# Patient Record
Sex: MALE | Race: WHITE | NOT HISPANIC OR LATINO | Employment: FULL TIME | URBAN - METROPOLITAN AREA
[De-identification: names, ages, dates, MRNs, and addresses within clinical notes are randomized per-mention and may not be internally consistent; named-entity substitution may affect disease eponyms.]

---

## 2021-04-14 ENCOUNTER — OFFICE VISIT (OUTPATIENT)
Dept: NURSING | Facility: CLINIC | Age: 26
End: 2021-04-14
Payer: COMMERCIAL

## 2021-04-14 PROCEDURE — 0001A PR COVID VAC PFIZER DIL RECON 30 MCG/0.3 ML IM: CPT

## 2021-04-14 PROCEDURE — 91300 PR COVID VAC PFIZER DIL RECON 30 MCG/0.3 ML IM: CPT

## 2021-05-05 ENCOUNTER — IMMUNIZATION (OUTPATIENT)
Dept: NURSING | Facility: CLINIC | Age: 26
End: 2021-05-05
Attending: INTERNAL MEDICINE
Payer: COMMERCIAL

## 2021-05-05 PROCEDURE — 91300 PR COVID VAC PFIZER DIL RECON 30 MCG/0.3 ML IM: CPT

## 2021-05-05 PROCEDURE — 0002A PR COVID VAC PFIZER DIL RECON 30 MCG/0.3 ML IM: CPT

## 2021-05-16 ENCOUNTER — HEALTH MAINTENANCE LETTER (OUTPATIENT)
Age: 26
End: 2021-05-16

## 2021-09-05 ENCOUNTER — HEALTH MAINTENANCE LETTER (OUTPATIENT)
Age: 26
End: 2021-09-05

## 2022-03-04 ENCOUNTER — HOSPITAL ENCOUNTER (EMERGENCY)
Facility: CLINIC | Age: 27
Discharge: OTHER INSTITUTION | End: 2022-03-04
Attending: EMERGENCY MEDICINE | Admitting: EMERGENCY MEDICINE
Payer: COMMERCIAL

## 2022-03-04 ENCOUNTER — APPOINTMENT (OUTPATIENT)
Dept: CT IMAGING | Facility: CLINIC | Age: 27
End: 2022-03-04
Attending: EMERGENCY MEDICINE
Payer: COMMERCIAL

## 2022-03-04 ENCOUNTER — APPOINTMENT (OUTPATIENT)
Dept: ULTRASOUND IMAGING | Facility: CLINIC | Age: 27
End: 2022-03-04
Attending: EMERGENCY MEDICINE
Payer: COMMERCIAL

## 2022-03-04 VITALS
RESPIRATION RATE: 36 BRPM | TEMPERATURE: 99.6 F | HEIGHT: 67 IN | SYSTOLIC BLOOD PRESSURE: 126 MMHG | OXYGEN SATURATION: 95 % | DIASTOLIC BLOOD PRESSURE: 101 MMHG | HEART RATE: 122 BPM | WEIGHT: 157 LBS | BODY MASS INDEX: 24.64 KG/M2

## 2022-03-04 DIAGNOSIS — R79.89 ELEVATED BRAIN NATRIURETIC PEPTIDE (BNP) LEVEL: ICD-10-CM

## 2022-03-04 DIAGNOSIS — I82.4Z2 LOWER LEG DVT (DEEP VENOUS THROMBOEMBOLISM), ACUTE, LEFT (H): ICD-10-CM

## 2022-03-04 DIAGNOSIS — I26.99 BILATERAL PULMONARY EMBOLISM (H): ICD-10-CM

## 2022-03-04 LAB
ALBUMIN UR-MCNC: 100 MG/DL
ANION GAP SERPL CALCULATED.3IONS-SCNC: 9 MMOL/L (ref 3–14)
APPEARANCE UR: CLEAR
BASOPHILS # BLD AUTO: 0 10E3/UL (ref 0–0.2)
BASOPHILS NFR BLD AUTO: 0 %
BILIRUB UR QL STRIP: NEGATIVE
BUN SERPL-MCNC: 15 MG/DL (ref 7–30)
CALCIUM SERPL-MCNC: 9.3 MG/DL (ref 8.5–10.1)
CHLORIDE BLD-SCNC: 101 MMOL/L (ref 94–109)
CO2 SERPL-SCNC: 26 MMOL/L (ref 20–32)
COLOR UR AUTO: YELLOW
CREAT SERPL-MCNC: 0.9 MG/DL (ref 0.66–1.25)
D DIMER PPP FEU-MCNC: 10.2 UG/ML FEU (ref 0–0.5)
EOSINOPHIL # BLD AUTO: 0 10E3/UL (ref 0–0.7)
EOSINOPHIL NFR BLD AUTO: 0 %
ERYTHROCYTE [DISTWIDTH] IN BLOOD BY AUTOMATED COUNT: 12.1 % (ref 10–15)
GFR SERPL CREATININE-BSD FRML MDRD: >90 ML/MIN/1.73M2
GLUCOSE BLD-MCNC: 107 MG/DL (ref 70–99)
GLUCOSE UR STRIP-MCNC: NEGATIVE MG/DL
HCT VFR BLD AUTO: 51.6 % (ref 40–53)
HGB BLD-MCNC: 17.3 G/DL (ref 13.3–17.7)
HGB UR QL STRIP: ABNORMAL
HOLD SPECIMEN: NORMAL
IMM GRANULOCYTES # BLD: 0 10E3/UL
IMM GRANULOCYTES NFR BLD: 0 %
KETONES UR STRIP-MCNC: ABNORMAL MG/DL
LEUKOCYTE ESTERASE UR QL STRIP: NEGATIVE
LYMPHOCYTES # BLD AUTO: 0.4 10E3/UL (ref 0.8–5.3)
LYMPHOCYTES NFR BLD AUTO: 4 %
MCH RBC QN AUTO: 32.8 PG (ref 26.5–33)
MCHC RBC AUTO-ENTMCNC: 33.5 G/DL (ref 31.5–36.5)
MCV RBC AUTO: 98 FL (ref 78–100)
MONOCYTES # BLD AUTO: 0.5 10E3/UL (ref 0–1.3)
MONOCYTES NFR BLD AUTO: 5 %
MUCOUS THREADS #/AREA URNS LPF: PRESENT /LPF
NEUTROPHILS # BLD AUTO: 10.1 10E3/UL (ref 1.6–8.3)
NEUTROPHILS NFR BLD AUTO: 91 %
NITRATE UR QL: NEGATIVE
NRBC # BLD AUTO: 0 10E3/UL
NRBC BLD AUTO-RTO: 0 /100
NT-PROBNP SERPL-MCNC: 542 PG/ML (ref 0–450)
PH UR STRIP: 6 [PH] (ref 5–7)
PLATELET # BLD AUTO: 214 10E3/UL (ref 150–450)
POTASSIUM BLD-SCNC: 4.2 MMOL/L (ref 3.4–5.3)
RBC # BLD AUTO: 5.27 10E6/UL (ref 4.4–5.9)
RBC URINE: 2 /HPF
SODIUM SERPL-SCNC: 136 MMOL/L (ref 133–144)
SP GR UR STRIP: 1.03 (ref 1–1.03)
TROPONIN I SERPL HS-MCNC: 12 NG/L
UROBILINOGEN UR STRIP-MCNC: NORMAL MG/DL
WBC # BLD AUTO: 11.1 10E3/UL (ref 4–11)
WBC URINE: 0 /HPF

## 2022-03-04 PROCEDURE — 85025 COMPLETE CBC W/AUTO DIFF WBC: CPT | Performed by: EMERGENCY MEDICINE

## 2022-03-04 PROCEDURE — 250N000011 HC RX IP 250 OP 636: Performed by: EMERGENCY MEDICINE

## 2022-03-04 PROCEDURE — 96361 HYDRATE IV INFUSION ADD-ON: CPT

## 2022-03-04 PROCEDURE — 96376 TX/PRO/DX INJ SAME DRUG ADON: CPT

## 2022-03-04 PROCEDURE — 82310 ASSAY OF CALCIUM: CPT | Performed by: EMERGENCY MEDICINE

## 2022-03-04 PROCEDURE — 250N000013 HC RX MED GY IP 250 OP 250 PS 637: Performed by: EMERGENCY MEDICINE

## 2022-03-04 PROCEDURE — 36415 COLL VENOUS BLD VENIPUNCTURE: CPT | Performed by: EMERGENCY MEDICINE

## 2022-03-04 PROCEDURE — 74177 CT ABD & PELVIS W/CONTRAST: CPT

## 2022-03-04 PROCEDURE — 258N000003 HC RX IP 258 OP 636: Performed by: EMERGENCY MEDICINE

## 2022-03-04 PROCEDURE — 96375 TX/PRO/DX INJ NEW DRUG ADDON: CPT | Mod: 59

## 2022-03-04 PROCEDURE — 93971 EXTREMITY STUDY: CPT | Mod: LT

## 2022-03-04 PROCEDURE — 84484 ASSAY OF TROPONIN QUANT: CPT | Performed by: EMERGENCY MEDICINE

## 2022-03-04 PROCEDURE — 81001 URINALYSIS AUTO W/SCOPE: CPT | Performed by: EMERGENCY MEDICINE

## 2022-03-04 PROCEDURE — 93005 ELECTROCARDIOGRAM TRACING: CPT

## 2022-03-04 PROCEDURE — 83880 ASSAY OF NATRIURETIC PEPTIDE: CPT | Performed by: EMERGENCY MEDICINE

## 2022-03-04 PROCEDURE — 99285 EMERGENCY DEPT VISIT HI MDM: CPT | Mod: 25

## 2022-03-04 PROCEDURE — 96365 THER/PROPH/DIAG IV INF INIT: CPT | Mod: 59

## 2022-03-04 PROCEDURE — 85379 FIBRIN DEGRADATION QUANT: CPT | Performed by: EMERGENCY MEDICINE

## 2022-03-04 PROCEDURE — 96366 THER/PROPH/DIAG IV INF ADDON: CPT

## 2022-03-04 RX ORDER — IOPAMIDOL 755 MG/ML
500 INJECTION, SOLUTION INTRAVASCULAR ONCE
Status: COMPLETED | OUTPATIENT
Start: 2022-03-04 | End: 2022-03-04

## 2022-03-04 RX ORDER — ACETAMINOPHEN 500 MG
1000 TABLET ORAL ONCE
Status: COMPLETED | OUTPATIENT
Start: 2022-03-04 | End: 2022-03-04

## 2022-03-04 RX ORDER — HEPARIN SODIUM 10000 [USP'U]/100ML
0-5000 INJECTION, SOLUTION INTRAVENOUS CONTINUOUS
Status: DISCONTINUED | OUTPATIENT
Start: 2022-03-04 | End: 2022-03-05 | Stop reason: HOSPADM

## 2022-03-04 RX ORDER — ONDANSETRON 2 MG/ML
4 INJECTION INTRAMUSCULAR; INTRAVENOUS ONCE
Status: COMPLETED | OUTPATIENT
Start: 2022-03-04 | End: 2022-03-04

## 2022-03-04 RX ORDER — KETOROLAC TROMETHAMINE 15 MG/ML
15 INJECTION, SOLUTION INTRAMUSCULAR; INTRAVENOUS ONCE
Status: COMPLETED | OUTPATIENT
Start: 2022-03-04 | End: 2022-03-04

## 2022-03-04 RX ORDER — HYDROMORPHONE HYDROCHLORIDE 1 MG/ML
0.5 INJECTION, SOLUTION INTRAMUSCULAR; INTRAVENOUS; SUBCUTANEOUS
Status: DISCONTINUED | OUTPATIENT
Start: 2022-03-04 | End: 2022-03-05 | Stop reason: HOSPADM

## 2022-03-04 RX ADMIN — ACETAMINOPHEN 1000 MG: 500 TABLET, FILM COATED ORAL at 22:26

## 2022-03-04 RX ADMIN — HYDROMORPHONE HYDROCHLORIDE 0.5 MG: 1 INJECTION, SOLUTION INTRAMUSCULAR; INTRAVENOUS; SUBCUTANEOUS at 23:38

## 2022-03-04 RX ADMIN — KETOROLAC TROMETHAMINE 15 MG: 15 INJECTION, SOLUTION INTRAMUSCULAR; INTRAVENOUS at 18:13

## 2022-03-04 RX ADMIN — SODIUM CHLORIDE 1000 ML: 9 INJECTION, SOLUTION INTRAVENOUS at 17:56

## 2022-03-04 RX ADMIN — ONDANSETRON 4 MG: 2 INJECTION INTRAMUSCULAR; INTRAVENOUS at 17:56

## 2022-03-04 RX ADMIN — IOPAMIDOL 75 ML: 755 INJECTION, SOLUTION INTRAVENOUS at 19:16

## 2022-03-04 RX ADMIN — HEPARIN SODIUM AND DEXTROSE 1300 UNITS/HR: 10000; 5 INJECTION INTRAVENOUS at 19:55

## 2022-03-04 ASSESSMENT — ENCOUNTER SYMPTOMS
VOMITING: 1
COUGH: 0
NAUSEA: 1
FEVER: 0
SHORTNESS OF BREATH: 1
MYALGIAS: 1

## 2022-03-04 NOTE — ED TRIAGE NOTES
Pt presents for complaint of left testicle, left calf, abdominal and chest pain. Pt states the calf and testicle pain started yesterday and the other symptoms onset today. States nausea started this morning and patient is dry heaving in triage. Seen at his clinic and sent in for evaluation. History of thrombotic diseases in his family. ABC intact, A&Ox4.  
no deformity, pain or tenderness. no restriction of movement

## 2022-03-04 NOTE — ED PROVIDER NOTES
History   Chief Complaint:  Chest Pain     HPI     Desmond Huerta is a 26 year old male who presents with left-sided pain.  Patient had asymptomatic COVID approximately 1 month prior.  He did well and has remained asymptomatic up until 2 days ago.  He began to have nausea and vomiting for last 2 days now with dry heaving.  Today he woke up with left calf pain that then radiated to the thigh, the testicle, the left side the abdomen and lower chest.  The pain is constant and aggravated by movement.  There are no alleviating factors.  He also mentioned feeling short of breath which is markedly worse with exertion and lying flat.  He reports that the calf pain is most significant.  He denies any urethral discharge, dysuria, hematuria.  He has a strong family history of antithrombin III disorder but he has no history of DVT, PE, recent immobilization, hemoptysis, malignancy.    Review of Systems   Constitutional: Negative for fever.   Respiratory: Positive for shortness of breath. Negative for cough.    Cardiovascular: Positive for chest pain.   Gastrointestinal: Positive for nausea and vomiting.   Musculoskeletal: Positive for myalgias.   Skin: Negative for rash.   All other systems reviewed and are negative.    Allergies:  No Known Allergies    Medications:  None    Past Medical History:     None     Family History:   Father: Antithrombin III deficiency, hyperlipidemia, hypertension   Mother: hypertension, hyperlipidemia      Social History:  Escorted to ER by a parent.   PCP: No Ref-Primary, Physician    Physical Exam     Patient Vitals for the past 24 hrs:   BP Temp Temp src Pulse Resp SpO2 Height Weight   03/04/22 2000 130/81 -- -- (!) 123 19 98 % -- --   03/04/22 1855 -- -- -- (!) 121 28 92 % -- --   03/04/22 1852 -- -- -- (!) 121 (!) 34 (!) 89 % -- --   03/04/22 1830 (!) 116/93 -- -- (!) 122 30 92 % -- --   03/04/22 1803 134/83 -- -- (!) 122 25 95 % -- --   03/04/22 1745 129/86 -- -- 112 (!) 34 96 % -- --  "  03/04/22 1729 134/80 99.6  F (37.6  C) Oral (!) 134 16 95 % 1.702 m (5' 7\") 71.2 kg (157 lb)     Physical Exam    Eyes:    Conjunctiva normal  Neck:    Supple, no meningismus.     CV:     Regular rate and rhythm.      No murmurs, rubs or gallops.         No lower extremity edema.     2+ DP pulses bilateral  PULM:    Clear to auscultation bilateral.       No respiratory distress.      Good air exchange.     No rales or wheezing.     No stridor.  ABD:    Soft, non-distended.       No abdominal tenderness.     Bowel sounds normal.     No pulsatile masses.       No rebound, guarding or rigidity.  :   Paulino stage V male, circumcised genitalia.     No inguinal lymphadenopathy.     No inguinal hernia.     No urethral discharge.     Testes in a normal position/lie.     + cremasteric reflex.     No focal testicular or epididymal tenderness.  MSK:     LLE:      No overt edema      No warmth erythema      Mild diffuse tenderness through the calf      Positive Hoffman test  LYMPH:   No cervical lymphadenopathy.  NEURO:   Alert.  Good muscular tone, no atrophy.      Strength is equal and symmetric.  Skin:    Warm, dry and intact.    Psych:    Mood is good and affect is appropriate.    Emergency Department Course   ECG:  ECG taken at 1751, ECG read at 1754  Sinus tachycardia   Rightward axis  Borderline ECG  Rate 111 bpm. TX interval 152 ms. QRS duration 84 ms. QT/QTc 334/454 ms. P-R-T axes 71 95 72.    Imaging:  US Lower Extremity Venous Duplex Left   Final Result   IMPRESSION:   1.  Occlusive and nonocclusive DVT within posterior tibial and peroneal veins within the calf and within the popliteal vein.      Results called to Percy Sandoval at 8:10 PM.      CT Chest (PE) Abdomen Pelvis w Contrast   Final Result   IMPRESSION:   1.  At least moderate burden of acute bilateral pulmonary emboli with evidence for right heart strain.   2.  Airspace disease left lower lobe and left upper lobe most consistent with pneumonia. " Follow-up recommended in 3 months to ensure resolution and exclude other underlying pathology.   3.  Hepatic steatosis.      Results called to Percy Sandoval at 7:50 PM.        Report per radiology    Laboratory:  Labs Ordered and Resulted from Time of ED Arrival to Time of ED Departure   BASIC METABOLIC PANEL - Abnormal       Result Value    Sodium 136      Potassium 4.2      Chloride 101      Carbon Dioxide (CO2) 26      Anion Gap 9      Urea Nitrogen 15      Creatinine 0.90      Calcium 9.3      Glucose 107 (*)     GFR Estimate >90     D DIMER QUANTITATIVE - Abnormal    D-Dimer Quantitative 10.20 (*)    CBC WITH PLATELETS AND DIFFERENTIAL - Abnormal    WBC Count 11.1 (*)     RBC Count 5.27      Hemoglobin 17.3      Hematocrit 51.6      MCV 98      MCH 32.8      MCHC 33.5      RDW 12.1      Platelet Count 214      % Neutrophils 91      % Lymphocytes 4      % Monocytes 5      % Eosinophils 0      % Basophils 0      % Immature Granulocytes 0      NRBCs per 100 WBC 0      Absolute Neutrophils 10.1 (*)     Absolute Lymphocytes 0.4 (*)     Absolute Monocytes 0.5      Absolute Eosinophils 0.0      Absolute Basophils 0.0      Absolute Immature Granulocytes 0.0      Absolute NRBCs 0.0     ROUTINE UA WITH MICROSCOPIC REFLEX TO CULTURE - Abnormal    Color Urine Yellow      Appearance Urine Clear      Glucose Urine Negative      Bilirubin Urine Negative      Ketones Urine Trace (*)     Specific Gravity Urine 1.033      Blood Urine Trace (*)     pH Urine 6.0      Protein Albumin Urine 100  (*)     Urobilinogen Urine Normal      Nitrite Urine Negative      Leukocyte Esterase Urine Negative      Mucus Urine Present (*)     RBC Urine 2      WBC Urine 0     NT PROBNP INPATIENT - Abnormal    N terminal Pro BNP Inpatient 542 (*)    TROPONIN I - Normal    Troponin I High Sensitivity 12        Procedures    Emergency Department Course:    Reviewed:  I reviewed nursing notes, vitals, past medical history and Care  Everywhere    Assessments:   I obtained history and examined the patient as noted above.    I rechecked the patient and explained findings.     Consults:   I spoke with Dr. Thompson with Interventional Radiology on the phone.     I spoke with Dr. Ismael Ford with [facility] [place] the [place].     Interventions:    0.9% sodium chloride BOLUS 1000 mL, IV    ondansetron (ZOFRAN) injection 4 mg, IV    ketorolac (TORADOL) injection 15 mg, IV    heparin ANTICOAGULANT Loading dose for HIGH INTENSITY TREATMENT 5700 unites, IV    heparin infusion 25,000 units in D5W 250 mL ANTICOAGULANT 1300 units/hr, IV    Disposition:  The patient was transferred to via EMS. Dr. Ismael Ford accepted the patient for transfer.     Impression & Plan     Medical Decision Makin-year-old male presented to the ED ED with left leg pain that migrated to the left upper abdomen and left chest.  He was tachycardic in the absence of hypotension.  He developed hypoxia requiring 2 L per nasal cannula and has a strong family history of Antithrombin III deficiency.  Primary concern was for DVT and resulting thromboembolism with PE.      EKG with tachycardia but without right heart strain.  Basic laboratory studies revealed elevated BNP but troponin within normal limits.  He underwent CT scan of his chest/abdomen/pelvis revealing bilateral PE with moderate clot burden on the right greater than left with right heart strain is identified by RV dilatation.      Patient was initiated on heparin.  No indication for TPA as patient has remained hemodynamically stable.  I spoke with interventional radiologist and we discussed the need for thrombectomy.  They believe patient is a good candidate for thrombectomy given his submassive PE findings and young age.  They recommended transfer to a facility with IR capabilities.  Unfortunately there were no beds available at [facility].  Patient ultimately accepted by ED physician at [facility] for ongoing management.    Critical Care Time: was 30 minutes for this patient excluding procedures    Diagnosis:    ICD-10-CM    1. Bilateral pulmonary embolism (H)  I26.99    2. Elevated brain natriuretic peptide (BNP) level  R79.89    3. Lower leg DVT (deep venous thromboembolism), acute, left (H)  I82.4Z2        Discharge Medications:  New Prescriptions    No medications on file     Scribe Disclosure:  I, Juaquin Montes, am serving as a scribe at 5:36 PM on 3/4/2022 to document services personally performed by Percy Sandoval MD based on my observations and the provider's statements to me.     I, Michelle Lopez, am serving as a scribe at 6:37 PM on 3/4/2022 to document services personally performed by Percy Sandoval MD based on my observations and the provider's statements to me.         Percy Sandoval MD  03/04/22 6216

## 2022-03-05 LAB
ATRIAL RATE - MUSE: 111 BPM
DIASTOLIC BLOOD PRESSURE - MUSE: NORMAL MMHG
INTERPRETATION ECG - MUSE: NORMAL
P AXIS - MUSE: 71 DEGREES
PR INTERVAL - MUSE: 152 MS
QRS DURATION - MUSE: 84 MS
QT - MUSE: 334 MS
QTC - MUSE: 454 MS
R AXIS - MUSE: 95 DEGREES
SYSTOLIC BLOOD PRESSURE - MUSE: NORMAL MMHG
T AXIS - MUSE: 72 DEGREES
VENTRICULAR RATE- MUSE: 111 BPM

## 2022-04-22 ENCOUNTER — APPOINTMENT (OUTPATIENT)
Dept: GENERAL RADIOLOGY | Facility: CLINIC | Age: 27
End: 2022-04-22
Attending: NURSE PRACTITIONER
Payer: COMMERCIAL

## 2022-04-22 ENCOUNTER — HOSPITAL ENCOUNTER (EMERGENCY)
Facility: CLINIC | Age: 27
Discharge: HOME | End: 2022-04-22
Attending: NURSE PRACTITIONER | Admitting: NURSE PRACTITIONER
Payer: COMMERCIAL

## 2022-04-22 ENCOUNTER — APPOINTMENT (OUTPATIENT)
Dept: CT IMAGING | Facility: CLINIC | Age: 27
End: 2022-04-22
Attending: NURSE PRACTITIONER
Payer: COMMERCIAL

## 2022-04-22 VITALS
OXYGEN SATURATION: 97 % | DIASTOLIC BLOOD PRESSURE: 91 MMHG | TEMPERATURE: 97.7 F | BODY MASS INDEX: 26.68 KG/M2 | WEIGHT: 170 LBS | HEART RATE: 79 BPM | HEIGHT: 67 IN | RESPIRATION RATE: 19 BRPM | SYSTOLIC BLOOD PRESSURE: 123 MMHG

## 2022-04-22 DIAGNOSIS — I27.82 CHRONIC PULMONARY EMBOLISM (H): ICD-10-CM

## 2022-04-22 DIAGNOSIS — R07.9 CHEST PAIN, UNSPECIFIED TYPE: ICD-10-CM

## 2022-04-22 DIAGNOSIS — R07.9 CHEST PAIN: ICD-10-CM

## 2022-04-22 PROBLEM — I26.99 ACUTE PULMONARY EMBOLISM (H): Status: ACTIVE | Noted: 2022-03-05

## 2022-04-22 LAB
ALBUMIN SERPL-MCNC: 3.9 G/DL (ref 3.4–5)
ALP SERPL-CCNC: 96 U/L (ref 40–150)
ALT SERPL W P-5'-P-CCNC: 56 U/L (ref 0–70)
ANION GAP SERPL CALCULATED.3IONS-SCNC: 5 MMOL/L (ref 3–14)
AST SERPL W P-5'-P-CCNC: 35 U/L (ref 0–45)
ATRIAL RATE - MUSE: 80 BPM
BASOPHILS # BLD AUTO: 0.1 10E3/UL (ref 0–0.2)
BASOPHILS NFR BLD AUTO: 1 %
BILIRUB SERPL-MCNC: 0.3 MG/DL (ref 0.2–1.3)
BUN SERPL-MCNC: 14 MG/DL (ref 7–30)
CALCIUM SERPL-MCNC: 9.5 MG/DL (ref 8.5–10.1)
CHLORIDE BLD-SCNC: 106 MMOL/L (ref 94–109)
CO2 SERPL-SCNC: 27 MMOL/L (ref 20–32)
CREAT SERPL-MCNC: 0.91 MG/DL (ref 0.66–1.25)
D DIMER PPP FEU-MCNC: 0.35 UG/ML FEU (ref 0–0.5)
DIASTOLIC BLOOD PRESSURE - MUSE: NORMAL MMHG
EOSINOPHIL # BLD AUTO: 0.1 10E3/UL (ref 0–0.7)
EOSINOPHIL NFR BLD AUTO: 1 %
ERYTHROCYTE [DISTWIDTH] IN BLOOD BY AUTOMATED COUNT: 12.6 % (ref 10–15)
GFR SERPL CREATININE-BSD FRML MDRD: >90 ML/MIN/1.73M2
GLUCOSE BLD-MCNC: 118 MG/DL (ref 70–99)
HCT VFR BLD AUTO: 43.4 % (ref 40–53)
HGB BLD-MCNC: 14.4 G/DL (ref 13.3–17.7)
IMM GRANULOCYTES # BLD: 0 10E3/UL
IMM GRANULOCYTES NFR BLD: 0 %
INTERPRETATION ECG - MUSE: NORMAL
LIPASE SERPL-CCNC: 92 U/L (ref 73–393)
LYMPHOCYTES # BLD AUTO: 1.6 10E3/UL (ref 0.8–5.3)
LYMPHOCYTES NFR BLD AUTO: 26 %
MCH RBC QN AUTO: 33.3 PG (ref 26.5–33)
MCHC RBC AUTO-ENTMCNC: 33.2 G/DL (ref 31.5–36.5)
MCV RBC AUTO: 100 FL (ref 78–100)
MONOCYTES # BLD AUTO: 0.5 10E3/UL (ref 0–1.3)
MONOCYTES NFR BLD AUTO: 8 %
NEUTROPHILS # BLD AUTO: 4 10E3/UL (ref 1.6–8.3)
NEUTROPHILS NFR BLD AUTO: 64 %
NRBC # BLD AUTO: 0 10E3/UL
NRBC BLD AUTO-RTO: 0 /100
NT-PROBNP SERPL-MCNC: 9 PG/ML (ref 0–450)
P AXIS - MUSE: 65 DEGREES
PLATELET # BLD AUTO: 328 10E3/UL (ref 150–450)
POTASSIUM BLD-SCNC: 3.8 MMOL/L (ref 3.4–5.3)
PR INTERVAL - MUSE: 162 MS
PROT SERPL-MCNC: 7.2 G/DL (ref 6.8–8.8)
QRS DURATION - MUSE: 92 MS
QT - MUSE: 370 MS
QTC - MUSE: 426 MS
R AXIS - MUSE: 79 DEGREES
RBC # BLD AUTO: 4.33 10E6/UL (ref 4.4–5.9)
SODIUM SERPL-SCNC: 138 MMOL/L (ref 133–144)
SYSTOLIC BLOOD PRESSURE - MUSE: NORMAL MMHG
T AXIS - MUSE: 39 DEGREES
TROPONIN I SERPL HS-MCNC: <3 NG/L
TROPONIN I SERPL HS-MCNC: <3 NG/L
VENTRICULAR RATE- MUSE: 80 BPM
WBC # BLD AUTO: 6.2 10E3/UL (ref 4–11)

## 2022-04-22 PROCEDURE — 71275 CT ANGIOGRAPHY CHEST: CPT

## 2022-04-22 PROCEDURE — 36415 COLL VENOUS BLD VENIPUNCTURE: CPT | Performed by: EMERGENCY MEDICINE

## 2022-04-22 PROCEDURE — 80053 COMPREHEN METABOLIC PANEL: CPT | Performed by: NURSE PRACTITIONER

## 2022-04-22 PROCEDURE — 250N000011 HC RX IP 250 OP 636: Performed by: NURSE PRACTITIONER

## 2022-04-22 PROCEDURE — 85025 COMPLETE CBC W/AUTO DIFF WBC: CPT | Performed by: EMERGENCY MEDICINE

## 2022-04-22 PROCEDURE — 99285 EMERGENCY DEPT VISIT HI MDM: CPT | Mod: 25

## 2022-04-22 PROCEDURE — 85379 FIBRIN DEGRADATION QUANT: CPT | Performed by: EMERGENCY MEDICINE

## 2022-04-22 PROCEDURE — 250N000009 HC RX 250: Performed by: NURSE PRACTITIONER

## 2022-04-22 PROCEDURE — 93005 ELECTROCARDIOGRAM TRACING: CPT

## 2022-04-22 PROCEDURE — 36415 COLL VENOUS BLD VENIPUNCTURE: CPT | Performed by: NURSE PRACTITIONER

## 2022-04-22 PROCEDURE — 250N000013 HC RX MED GY IP 250 OP 250 PS 637: Performed by: NURSE PRACTITIONER

## 2022-04-22 PROCEDURE — 83880 ASSAY OF NATRIURETIC PEPTIDE: CPT | Performed by: NURSE PRACTITIONER

## 2022-04-22 PROCEDURE — 83690 ASSAY OF LIPASE: CPT | Performed by: NURSE PRACTITIONER

## 2022-04-22 PROCEDURE — 71046 X-RAY EXAM CHEST 2 VIEWS: CPT

## 2022-04-22 PROCEDURE — 84484 ASSAY OF TROPONIN QUANT: CPT | Performed by: NURSE PRACTITIONER

## 2022-04-22 RX ORDER — IOPAMIDOL 755 MG/ML
65 INJECTION, SOLUTION INTRAVASCULAR ONCE
Status: COMPLETED | OUTPATIENT
Start: 2022-04-22 | End: 2022-04-22

## 2022-04-22 RX ORDER — RIVAROXABAN 20 MG/1
TABLET, FILM COATED ORAL
COMMUNITY
Start: 2022-03-07

## 2022-04-22 RX ORDER — ACETAMINOPHEN 500 MG
1000 TABLET ORAL ONCE
Status: COMPLETED | OUTPATIENT
Start: 2022-04-22 | End: 2022-04-22

## 2022-04-22 RX ORDER — HYDROMORPHONE HYDROCHLORIDE 2 MG/1
TABLET ORAL
COMMUNITY
Start: 2022-03-07 | End: 2022-04-22

## 2022-04-22 RX ADMIN — SODIUM CHLORIDE 90 ML: 9 INJECTION, SOLUTION INTRAVENOUS at 14:01

## 2022-04-22 RX ADMIN — ACETAMINOPHEN 1000 MG: 500 TABLET, FILM COATED ORAL at 13:05

## 2022-04-22 RX ADMIN — IOPAMIDOL 65 ML: 755 INJECTION, SOLUTION INTRAVENOUS at 14:01

## 2022-04-22 ASSESSMENT — ENCOUNTER SYMPTOMS: SHORTNESS OF BREATH: 1

## 2022-04-22 NOTE — ED PROVIDER NOTES
"  History   Chief Complaint:  Chest Pain       The history is provided by the patient.      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 3/5/22 Desmond had been diagnosed with a PE and LLE DVT. He has been taking his Xarelto since then without missing any doses. On Wednesday Desmond worked out at the gym, reportedly feeling fine afterwards. Yesterday morning Desmond experienced an onset of right sided chest pain. This pain worsened this morning, prompting him to visit the ED. Pain is described as dull, achy, and sharp, with the sensation changing between these intermittently. Pain also seems to improve when he stretches his arms above his head. Here at the ED he endorses concern for another PE, although his does not feel this pain is similar to his last PE. Confirms shortness of breath at bedside. Denies any recent travel or tobacco use. No leg swelling.     Review of Systems   Respiratory: Positive for shortness of breath.    Cardiovascular: Positive for chest pain. Negative for leg swelling.   All other systems reviewed and are negative.    Allergies:  No Known Allergies    Medications:  Xarelto    Past Medical History:     PE without acute cor pulmonale  Antithrombin 3 deficiency     Past Surgical History:    The patient does not have any pertinent past surgical history.     Family History:    Factor V Leiden  PE  DVT  Stroke    Social History:  Patient unaccompanied  PCP: No Ref-Primary, Physician   Tobacco: Negative    Physical Exam     Patient Vitals for the past 24 hrs:   BP Temp Temp src Pulse Resp SpO2 Height Weight   04/22/22 1440 111/70 -- -- 67 -- -- -- --   04/22/22 1252 -- -- -- 73 19 -- -- --   04/22/22 1038 139/71 97.7  F (36.5  C) Oral 81 20 97 % 1.702 m (5' 7\") 77.1 kg (170 lb)       Physical Exam  Nursing notes reviewed. Vitals reviewed.  General: Alert. Well kept.  Eyes:  Conjunctiva non-injected, " non-icteric.  Neck/Throat: Moist mucous membranes.  Normal voice.  Cardiac: Regular rhythm. Normal heart sounds with no murmur/rubs/click.   Pulmonary: Clear and equal breath sounds bilaterally. No crackles/rales. No wheezing  Abdomen: Soft. Non-distended. Non-tender to palpation. No masses. No guarding or rebound.  Musculoskeletal: Normal gross range of motion of all 4 extremities.    Neurological: Alert and oriented x4.   Skin: Warm and dry without rashes or petechiae. Normal appearance of visualized exposed skin. Wearing LLE compression stocking.  Psych: Affect normal. Good eye contact.    Emergency Department Course   ECG  ECG obtained at 1043, ECG read at 1120  Normal sinus rhythm with sinus arrhythmia. Normal ECG.    Agree with computer interpretation.  Rate 80 bpm. HI interval 162 ms. QRS duration 92 ms. QT/QTc 370/426 ms. P-R-T axes 65 79 39.     Imaging:  CT Chest Pulmonary Embolism w Contrast   Final Result   IMPRESSION:   1.  Minimal residual subacute/chronic PE in the right lower lobe,   significantly decreased when compared to previous. No acute PE.      DALIA HASSAN MD            SYSTEM ID:  [id number]      Chest XR,  PA & LAT   Preliminary Result   IMPRESSION: No acute cardiopulmonary disease.        Report per radiology    Laboratory:  Labs Ordered and Resulted from Time of ED Arrival to Time of ED Departure   COMPREHENSIVE METABOLIC PANEL - Abnormal       Result Value    Sodium 138      Potassium 3.8      Chloride 106      Carbon Dioxide (CO2) 27      Anion Gap 5      Urea Nitrogen 14      Creatinine 0.91      Calcium 9.5      Glucose 118 (*)     Alkaline Phosphatase 96      AST 35      ALT 56      Protein Total 7.2      Albumin 3.9      Bilirubin Total 0.3      GFR Estimate >90     CBC WITH PLATELETS AND DIFFERENTIAL - Abnormal    WBC Count 6.2      RBC Count 4.33 (*)     Hemoglobin 14.4      Hematocrit 43.4            MCH 33.3 (*)     MCHC 33.2      RDW 12.6      Platelet Count 328      %  Neutrophils 64      % Lymphocytes 26      % Monocytes 8      % Eosinophils 1      % Basophils 1      % Immature Granulocytes 0      NRBCs per 100 WBC 0      Absolute Neutrophils 4.0      Absolute Lymphocytes 1.6      Absolute Monocytes 0.5      Absolute Eosinophils 0.1      Absolute Basophils 0.1      Absolute Immature Granulocytes 0.0      Absolute NRBCs 0.0     D DIMER QUANTITATIVE - Normal    D-Dimer Quantitative 0.35     TROPONIN I - Normal    Troponin I High Sensitivity <3     NT PROBNP INPATIENT - Normal    N terminal Pro BNP Inpatient 9     LIPASE - Normal    Lipase 92     TROPONIN I - Normal    Troponin I High Sensitivity <3          Emergency Department Course:  Reviewed:  I reviewed nursing notes, vitals, past medical history and Care Everywhere    Assessments/Consults:  ED Course as of 04/22/22 1514   Fri Apr 22, 2022   1108 Obtained history and examined the patient as noted above.    1332 Consulted with Dr. Lagos's office. The staff there will reach out to the patient on Monday for a follow-up appointment.    1514 Rechecked the patient.      Interventions:  1305 Acetaminophen 1000 mg, Oral    Disposition:  The patient was discharged to home.     Impression & Plan     Medical Decision Making:  Desmond Huerta is a 26 year old male with history of pulmonary embolism status post Covid 19 infection with family history of antithrombin III deficiency and factor V Leiden mutation who presents for evaluation for chest wall pain. On exam the patient is not tachycardic and has no hypotension or hypoxia. His EKG shows normal sinus rhythm without acute change from prior and no ischemia. Troponin and delta troponin both returned negative making acute coronary syndrome unlikely. He did have a right heart strain with his PE and elevated BNP. Today BNP returns back normal. A D Dimer was obtained and returned negative. I spoke with hematology and oncology who will see patient early next week. The patient is requesting a  CT of the chest due to concern for pulmonary embolism. Discussed risks and benefits and in shared decision making, we elected to proceed with CT of the chest, which returned showing a small chronic pulmonary embolism decreased in size from previous and no acute PE which is consistent with the negative D Dimer. He has no calf swelling or pain. He is wearing a compression stocking on his left lower extremity since his DVT. There is no indication to repeat this ultrasound imaging of the lower extremity. Symptoms are most consistent with musculoskeletal cause. The patient feels comfortable going home. All questions were answered. He will follow up with hem/onc and his PCP next week and will return to the ED with any shortness of breath, worsening pain, or concerns.     Diagnosis:    ICD-10-CM    1. Chest pain  R07.9    2. Chronic pulmonary embolism (H)  I27.82        Scribe Disclosure:  Justen PASCUAL, am serving as a scribe at 11:07 AM on 4/22/2022 to document services personally performed by Snow Randall CNP based on my observations and the provider's statements to me.           [place], Snow, CNP  04/22/22 5663

## 2022-04-22 NOTE — ED TRIAGE NOTES
Dull chest pain yesterday - right sided chest pain worst today - hx of PE - pt concern that he has another PE   Denies any SOB   Pt chest pain developed today while at work

## 2022-05-22 ENCOUNTER — HOSPITAL ENCOUNTER (EMERGENCY)
Facility: CLINIC | Age: 27
Discharge: HOME | End: 2022-05-23
Attending: EMERGENCY MEDICINE | Admitting: EMERGENCY MEDICINE
Payer: COMMERCIAL

## 2022-05-22 ENCOUNTER — APPOINTMENT (OUTPATIENT)
Dept: GENERAL RADIOLOGY | Facility: CLINIC | Age: 27
End: 2022-05-22
Attending: EMERGENCY MEDICINE
Payer: COMMERCIAL

## 2022-05-22 DIAGNOSIS — R07.9 CHEST PAIN, UNSPECIFIED TYPE: ICD-10-CM

## 2022-05-22 DIAGNOSIS — Z86.718 HISTORY OF VENOUS THROMBOEMBOLISM: ICD-10-CM

## 2022-05-22 LAB
BASOPHILS # BLD AUTO: 0.1 10E3/UL (ref 0–0.2)
BASOPHILS NFR BLD AUTO: 1 %
EOSINOPHIL # BLD AUTO: 0.1 10E3/UL (ref 0–0.7)
EOSINOPHIL NFR BLD AUTO: 1 %
ERYTHROCYTE [DISTWIDTH] IN BLOOD BY AUTOMATED COUNT: 13.2 % (ref 10–15)
HCT VFR BLD AUTO: 46.3 % (ref 40–53)
HGB BLD-MCNC: 15.7 G/DL (ref 13.3–17.7)
IMM GRANULOCYTES # BLD: 0 10E3/UL
IMM GRANULOCYTES NFR BLD: 0 %
LYMPHOCYTES # BLD AUTO: 3.3 10E3/UL (ref 0.8–5.3)
LYMPHOCYTES NFR BLD AUTO: 32 %
MCH RBC QN AUTO: 33.3 PG (ref 26.5–33)
MCHC RBC AUTO-ENTMCNC: 33.9 G/DL (ref 31.5–36.5)
MCV RBC AUTO: 98 FL (ref 78–100)
MONOCYTES # BLD AUTO: 0.7 10E3/UL (ref 0–1.3)
MONOCYTES NFR BLD AUTO: 7 %
NEUTROPHILS # BLD AUTO: 6 10E3/UL (ref 1.6–8.3)
NEUTROPHILS NFR BLD AUTO: 59 %
NRBC # BLD AUTO: 0 10E3/UL
NRBC BLD AUTO-RTO: 0 /100
PLATELET # BLD AUTO: 302 10E3/UL (ref 150–450)
RBC # BLD AUTO: 4.71 10E6/UL (ref 4.4–5.9)
WBC # BLD AUTO: 10.2 10E3/UL (ref 4–11)

## 2022-05-22 PROCEDURE — 250N000013 HC RX MED GY IP 250 OP 250 PS 637: Performed by: EMERGENCY MEDICINE

## 2022-05-22 PROCEDURE — 93005 ELECTROCARDIOGRAM TRACING: CPT

## 2022-05-22 PROCEDURE — 85652 RBC SED RATE AUTOMATED: CPT | Performed by: EMERGENCY MEDICINE

## 2022-05-22 PROCEDURE — 96374 THER/PROPH/DIAG INJ IV PUSH: CPT

## 2022-05-22 PROCEDURE — 84484 ASSAY OF TROPONIN QUANT: CPT | Performed by: EMERGENCY MEDICINE

## 2022-05-22 PROCEDURE — 258N000003 HC RX IP 258 OP 636: Performed by: EMERGENCY MEDICINE

## 2022-05-22 PROCEDURE — 71046 X-RAY EXAM CHEST 2 VIEWS: CPT

## 2022-05-22 PROCEDURE — 250N000011 HC RX IP 250 OP 636: Performed by: EMERGENCY MEDICINE

## 2022-05-22 PROCEDURE — 36415 COLL VENOUS BLD VENIPUNCTURE: CPT | Performed by: EMERGENCY MEDICINE

## 2022-05-22 PROCEDURE — 85025 COMPLETE CBC W/AUTO DIFF WBC: CPT | Performed by: EMERGENCY MEDICINE

## 2022-05-22 PROCEDURE — 96361 HYDRATE IV INFUSION ADD-ON: CPT

## 2022-05-22 PROCEDURE — 99285 EMERGENCY DEPT VISIT HI MDM: CPT | Mod: 25

## 2022-05-22 PROCEDURE — 80048 BASIC METABOLIC PNL TOTAL CA: CPT | Performed by: EMERGENCY MEDICINE

## 2022-05-22 PROCEDURE — 86140 C-REACTIVE PROTEIN: CPT | Performed by: EMERGENCY MEDICINE

## 2022-05-22 RX ORDER — MORPHINE SULFATE 4 MG/ML
4 INJECTION, SOLUTION INTRAMUSCULAR; INTRAVENOUS ONCE
Status: COMPLETED | OUTPATIENT
Start: 2022-05-22 | End: 2022-05-22

## 2022-05-22 RX ORDER — HYDROCODONE BITARTRATE AND ACETAMINOPHEN 5; 325 MG/1; MG/1
1-2 TABLET ORAL ONCE
Status: COMPLETED | OUTPATIENT
Start: 2022-05-22 | End: 2022-05-22

## 2022-05-22 RX ADMIN — SODIUM CHLORIDE 1000 ML: 9 INJECTION, SOLUTION INTRAVENOUS at 23:35

## 2022-05-22 RX ADMIN — HYDROCODONE BITARTRATE AND ACETAMINOPHEN 1 TABLET: 5; 325 TABLET ORAL at 23:28

## 2022-05-22 RX ADMIN — MORPHINE SULFATE 4 MG: 4 INJECTION INTRAVENOUS at 23:27

## 2022-05-22 ASSESSMENT — ENCOUNTER SYMPTOMS
FEVER: 0
SHORTNESS OF BREATH: 1
COUGH: 0
VOMITING: 0

## 2022-05-23 VITALS
HEART RATE: 87 BPM | TEMPERATURE: 97.6 F | SYSTOLIC BLOOD PRESSURE: 124 MMHG | RESPIRATION RATE: 8 BRPM | DIASTOLIC BLOOD PRESSURE: 86 MMHG | OXYGEN SATURATION: 97 %

## 2022-05-23 LAB
ANION GAP SERPL CALCULATED.3IONS-SCNC: 3 MMOL/L (ref 3–14)
ATRIAL RATE - MUSE: 94 BPM
BUN SERPL-MCNC: 12 MG/DL (ref 7–30)
CALCIUM SERPL-MCNC: 8.9 MG/DL (ref 8.5–10.1)
CHLORIDE BLD-SCNC: 108 MMOL/L (ref 94–109)
CO2 SERPL-SCNC: 27 MMOL/L (ref 20–32)
CREAT SERPL-MCNC: 0.76 MG/DL (ref 0.66–1.25)
CRP SERPL-MCNC: <2.9 MG/L (ref 0–8)
DIASTOLIC BLOOD PRESSURE - MUSE: NORMAL MMHG
ERYTHROCYTE [SEDIMENTATION RATE] IN BLOOD BY WESTERGREN METHOD: 4 MM/HR (ref 0–15)
GFR SERPL CREATININE-BSD FRML MDRD: >90 ML/MIN/1.73M2
GLUCOSE BLD-MCNC: 98 MG/DL (ref 70–99)
INTERPRETATION ECG - MUSE: NORMAL
P AXIS - MUSE: 74 DEGREES
POTASSIUM BLD-SCNC: 4 MMOL/L (ref 3.4–5.3)
PR INTERVAL - MUSE: 172 MS
QRS DURATION - MUSE: 90 MS
QT - MUSE: 338 MS
QTC - MUSE: 422 MS
R AXIS - MUSE: 85 DEGREES
SODIUM SERPL-SCNC: 138 MMOL/L (ref 133–144)
SYSTOLIC BLOOD PRESSURE - MUSE: NORMAL MMHG
T AXIS - MUSE: 52 DEGREES
TROPONIN I SERPL HS-MCNC: 6 NG/L
VENTRICULAR RATE- MUSE: 94 BPM

## 2022-05-23 RX ORDER — HYDROCODONE BITARTRATE AND ACETAMINOPHEN 5; 325 MG/1; MG/1
1 TABLET ORAL EVERY 6 HOURS PRN
Qty: 10 TABLET | Refills: 0 | Status: SHIPPED | OUTPATIENT
Start: 2022-05-23

## 2022-05-23 NOTE — ED PROVIDER NOTES
History   Chief Complaint:  Chest Pain     The history is provided by the patient and a relative (father).      Desmond Huerta is a 26 year old male with history of antithrombin III deficiency with DVT/PE s/p thrombectomy in March 2022 on Xarelto who presents with chest pain. He was evaluated for ongoing chest pain in April 2022 at [facility] with repeat CTA chest revealing minimal residual chronic PE, as below. He returns today with chest pain and left lower leg pain. These have been persistent since his diagnoses but worsened today. The pain is constant with no exacerbating or alleviating factors. He has associated shortness of breath but no fever, cough, or vomiting. He has had no analgesics prior to arrival. He has missed no doses of Xarelto.     CT Chest Pulmonary Embolism w contrast from ED visit on 04/22/2022:  1.  Minimal residual subacute/chronic PE in the right lower lobe,  significantly decreased when compared to previous. No acute PE.    Review of Systems   Constitutional: Negative for fever.   Respiratory: Positive for shortness of breath. Negative for cough.    Cardiovascular: Positive for chest pain. Negative for leg swelling.   Gastrointestinal: Negative for vomiting.   Musculoskeletal:        Left calf pain   All other systems reviewed and are negative.    Allergies:  No Known Allergies    Medications:  Xarelto     Past Medical History:     PE  DVT  Antithrombin III deficiency     Past Surgical History:    The patient denies past surgical history.      Family History:    Antithrombin III deficiency, father  Hyperlipidemia, father/mother  Hypertension, father/mother  ASD, sister    Social History:  Presents to ED with his father.    Physical Exam     Patient Vitals for the past 24 hrs:   BP Temp Temp src Pulse Resp SpO2   05/23/22 0100 -- -- -- 81 -- 94 %   05/23/22 0000 -- -- -- 82 8 96 %   05/22/22 2224 124/88 97.6  F (36.4  C) Temporal 100 18 98 %       Physical Exam  General: Well-developed  and well-nourished. Well appearing young  man. Cooperative.  Head:  Atraumatic.  Eyes:  Conjunctivae, lids, and sclerae are normal.  Neck:  Supple. Normal range of motion.  CV:  Regular rate and rhythm. Normal heart sounds with no murmurs, rubs, or gallops detected.  Resp:  No respiratory distress. Clear to auscultation bilaterally without decreased breath sounds, wheezing, rales, or rhonchi.  GI:  Soft. Non-distended. Non-tender.    MS:  Normal ROM. No bilateral lower extremity edema.  Tenderness to palpation of the left calf with compression stocking in place.  Skin:  Warm. Non-diaphoretic. No pallor.  Neuro:  Awake. A&Ox3. Normal strength.  Psych: Normal mood and affect. Normal speech.  Vitals reviewed.    Emergency Department Course   EKG  Time: 2245  Rate 94 bpm. [place] interval 172. QRS duration 90. QT/QTc 338/422.   Normal sinus rhythm  Possible acute pericarditis   Abnormal ECG   No acute ST changes.  No significant change as compared to prior, dated 04/22/2022.    Imaging:  XR Chest 2 Views   Final Result   IMPRESSION: Negative chest.        Report per radiology    Laboratory:  Labs Ordered and Resulted from Time of ED Arrival to Time of ED Departure   CBC WITH PLATELETS AND DIFFERENTIAL - Abnormal       Result Value    WBC Count 10.2      RBC Count 4.71      Hemoglobin 15.7      Hematocrit 46.3      MCV 98      MCH 33.3 (*)     MCHC 33.9      RDW 13.2      Platelet Count 302      % Neutrophils 59      % Lymphocytes 32      % Monocytes 7      % Eosinophils 1      % Basophils 1      % Immature Granulocytes 0      NRBCs per 100 WBC 0      Absolute Neutrophils 6.0      Absolute Lymphocytes 3.3      Absolute Monocytes 0.7      Absolute Eosinophils 0.1      Absolute Basophils 0.1      Absolute Immature Granulocytes 0.0      Absolute NRBCs 0.0     BASIC METABOLIC PANEL - Normal    Sodium 138      Potassium 4.0      Chloride 108      Carbon Dioxide (CO2) 27      Anion Gap 3      Urea Nitrogen 12       Creatinine 0.76      Calcium 8.9      Glucose 98      GFR Estimate >90     TROPONIN I - Normal    Troponin I High Sensitivity 6     ERYTHROCYTE SEDIMENTATION RATE AUTO - Normal    Erythrocyte Sedimentation Rate 4     CRP INFLAMMATION - Normal    CRP Inflammation <2.9        Emergency Department Course:     Reviewed:  I reviewed nursing notes, vitals, past medical history, and Care Everywhere.    Assessments:  2257 I obtained history and examined the patient as noted above.   1253 I rechecked the patient, who is sleeping comfortably. I explained findings to his father.   0119 I woke the patient up and explained findings. He feels much better and would like to be discharged. He notes that ne needs a refill of his Xarelto.     Interventions:  2327 Morphine 4 mg, IV  2328 Norco 5-325 mg 1 tablet, PO  2335 NS 1L, IV Bolus     Disposition:  The patient was discharged home.     Impression & Plan     Medical Decision Making:  Desmond is a 26-year-old man with Antithrombin III deficiency and resultant VTE requiring thrombectomy in March who has had persistent left leg and chest pain.  He tells me this is present at all times but it worsened today which prompted his visit.  He does feel short of breath but denies any other symptoms.  He appears well on exam.  He describes tenderness to palpation of his left calf although there is no edema.  He is not hypoxic or tachycardic.  His lungs are clear.  His EKG is reassuring without acute ST changes or arrhythmias.  Notably the computer read is for pericarditis but this is likely early repolarization and his EKG is absolutely unchanged from that of 1 month ago and certainly that would not be consistent with acute pericarditis.  Further, ESR and CRP are normal.  Troponin is normal, as expected.  The remainder of his laboratory studies are reassuring as is his chest x-ray.  He was treated with morphine, Norco, and IV fluids and on repeat evaluation is feeling improved.  I have  recommended he continue Tylenol for mild pain and I provided a small number of Norco for more severe pain.  I have encouraged him to follow-up with hematology and to discuss his plan going forward and possible referral to a pain clinic.  He has not missed any doses of his Xarelto but he does not have any refills so I will refill this.  Because he has not missed any doses of his Xarelto he agrees with plan not to rescan his chest today.  We discussed indications for return and I answered all of his and his father's questions.  They verbalized understanding and are amenable to discharge.    Diagnosis:    ICD-10-CM    1. Chest pain, unspecified type  R07.9    2. History of venous thromboembolism  Z86.718      Discharge Medications:  New Prescriptions    HYDROCODONE-ACETAMINOPHEN (NORCO) 5-325 MG TABLET    Take 1 tablet by mouth every 6 hours as needed for pain    RIVAROXABAN ANTICOAGULANT (XARELTO ANTICOAGULANT) 20 MG TABS TABLET    Take 1 tablet (20 mg) by mouth daily (with dinner)     Scribe Disclosure:  I, Erum Person, am serving as a scribe at 10:57 PM on 5/22/2022 to document services personally performed by Victoria Lo MD based on my observations and the provider's statements to me.      Victoria Lo MD  05/26/22 4906

## 2022-05-23 NOTE — ED TRIAGE NOTES
Here for left sided chest pain all day associated with sob. Was seen at [facility] on 4/22 for same pain and chronic PE. Was diagnose with bilaterarl PE and DVT on 3/4.  Is taking xeralto, but did miss today's dose. ABCs intact.      Triage Assessment     Row Name 05/22/22 2830       Triage Assessment (Adult)    Airway WDL WDL       Respiratory WDL    Respiratory WDL WDL       Cardiac WDL    Cardiac WDL WDL

## 2022-05-23 NOTE — DISCHARGE INSTRUCTIONS
Tylenol for mild pain.  Norco for more severe pain.  Please call [facility] hematology to discuss blood thinner plan and refills.  Do not miss any doses of this.  Ask hematology or your primary care provider for a plan for pain.  They may refer you to a pain clinic.  Return with worsening symptoms or new concerns.

## 2022-06-12 ENCOUNTER — HEALTH MAINTENANCE LETTER (OUTPATIENT)
Age: 27
End: 2022-06-12

## 2022-10-23 ENCOUNTER — HEALTH MAINTENANCE LETTER (OUTPATIENT)
Age: 27
End: 2022-10-23

## 2023-03-05 ENCOUNTER — APPOINTMENT (OUTPATIENT)
Dept: CT IMAGING | Facility: CLINIC | Age: 28
End: 2023-03-05
Attending: EMERGENCY MEDICINE

## 2023-03-05 ENCOUNTER — HOSPITAL ENCOUNTER (EMERGENCY)
Facility: CLINIC | Age: 28
Discharge: HOME | End: 2023-03-06
Attending: EMERGENCY MEDICINE | Admitting: EMERGENCY MEDICINE

## 2023-03-05 DIAGNOSIS — R10.31 ABDOMINAL PAIN, RIGHT LOWER QUADRANT: ICD-10-CM

## 2023-03-05 LAB
ALBUMIN SERPL BCG-MCNC: 5.3 G/DL (ref 3.5–5.2)
ALBUMIN UR-MCNC: 30 MG/DL
ALP SERPL-CCNC: 92 U/L (ref 40–129)
ALT SERPL W P-5'-P-CCNC: 101 U/L (ref 10–50)
ANION GAP SERPL CALCULATED.3IONS-SCNC: 14 MMOL/L (ref 7–15)
APPEARANCE UR: CLEAR
AST SERPL W P-5'-P-CCNC: 63 U/L (ref 10–50)
BASOPHILS # BLD AUTO: 0 10E3/UL (ref 0–0.2)
BASOPHILS NFR BLD AUTO: 0 %
BILIRUB DIRECT SERPL-MCNC: <0.2 MG/DL (ref 0–0.3)
BILIRUB SERPL-MCNC: 0.9 MG/DL
BILIRUB UR QL STRIP: NEGATIVE
BUN SERPL-MCNC: 15.1 MG/DL (ref 6–20)
CALCIUM SERPL-MCNC: 9.9 MG/DL (ref 8.6–10)
CHLORIDE SERPL-SCNC: 96 MMOL/L (ref 98–107)
COLOR UR AUTO: YELLOW
CREAT SERPL-MCNC: 0.91 MG/DL (ref 0.67–1.17)
D DIMER PPP FEU-MCNC: 0.48 UG/ML FEU (ref 0–0.5)
DEPRECATED HCO3 PLAS-SCNC: 27 MMOL/L (ref 22–29)
EOSINOPHIL # BLD AUTO: 0.1 10E3/UL (ref 0–0.7)
EOSINOPHIL NFR BLD AUTO: 1 %
ERYTHROCYTE [DISTWIDTH] IN BLOOD BY AUTOMATED COUNT: 12 % (ref 10–15)
GFR SERPL CREATININE-BSD FRML MDRD: >90 ML/MIN/1.73M2
GLUCOSE SERPL-MCNC: 103 MG/DL (ref 70–99)
GLUCOSE UR STRIP-MCNC: NEGATIVE MG/DL
HCT VFR BLD AUTO: 48.6 % (ref 40–53)
HGB BLD-MCNC: 17 G/DL (ref 13.3–17.7)
HGB UR QL STRIP: NEGATIVE
HOLD SPECIMEN: NORMAL
HOLD SPECIMEN: NORMAL
IMM GRANULOCYTES # BLD: 0.1 10E3/UL
IMM GRANULOCYTES NFR BLD: 1 %
KETONES UR STRIP-MCNC: NEGATIVE MG/DL
LEUKOCYTE ESTERASE UR QL STRIP: NEGATIVE
LIPASE SERPL-CCNC: 16 U/L (ref 13–60)
LYMPHOCYTES # BLD AUTO: 0.8 10E3/UL (ref 0.8–5.3)
LYMPHOCYTES NFR BLD AUTO: 7 %
MCH RBC QN AUTO: 33.7 PG (ref 26.5–33)
MCHC RBC AUTO-ENTMCNC: 35 G/DL (ref 31.5–36.5)
MCV RBC AUTO: 96 FL (ref 78–100)
MONOCYTES # BLD AUTO: 0.9 10E3/UL (ref 0–1.3)
MONOCYTES NFR BLD AUTO: 8 %
MUCOUS THREADS #/AREA URNS LPF: PRESENT /LPF
NEUTROPHILS # BLD AUTO: 9.8 10E3/UL (ref 1.6–8.3)
NEUTROPHILS NFR BLD AUTO: 83 %
NITRATE UR QL: NEGATIVE
NRBC # BLD AUTO: 0 10E3/UL
NRBC BLD AUTO-RTO: 0 /100
PH UR STRIP: 6 [PH] (ref 5–7)
PLATELET # BLD AUTO: 268 10E3/UL (ref 150–450)
POTASSIUM SERPL-SCNC: 4.5 MMOL/L (ref 3.4–5.3)
PROT SERPL-MCNC: 8.4 G/DL (ref 6.4–8.3)
RBC # BLD AUTO: 5.04 10E6/UL (ref 4.4–5.9)
RBC URINE: <1 /HPF
SODIUM SERPL-SCNC: 137 MMOL/L (ref 136–145)
SP GR UR STRIP: 1.01 (ref 1–1.03)
SQUAMOUS EPITHELIAL: <1 /HPF
TROPONIN T SERPL HS-MCNC: <6 NG/L
UROBILINOGEN UR STRIP-MCNC: NORMAL MG/DL
WBC # BLD AUTO: 11.6 10E3/UL (ref 4–11)
WBC URINE: <1 /HPF

## 2023-03-05 PROCEDURE — 96361 HYDRATE IV INFUSION ADD-ON: CPT

## 2023-03-05 PROCEDURE — 80076 HEPATIC FUNCTION PANEL: CPT | Performed by: EMERGENCY MEDICINE

## 2023-03-05 PROCEDURE — 250N000011 HC RX IP 250 OP 636: Performed by: EMERGENCY MEDICINE

## 2023-03-05 PROCEDURE — 258N000003 HC RX IP 258 OP 636: Performed by: EMERGENCY MEDICINE

## 2023-03-05 PROCEDURE — 85025 COMPLETE CBC W/AUTO DIFF WBC: CPT | Performed by: EMERGENCY MEDICINE

## 2023-03-05 PROCEDURE — 96375 TX/PRO/DX INJ NEW DRUG ADDON: CPT

## 2023-03-05 PROCEDURE — 99285 EMERGENCY DEPT VISIT HI MDM: CPT | Mod: 25

## 2023-03-05 PROCEDURE — 85379 FIBRIN DEGRADATION QUANT: CPT | Performed by: EMERGENCY MEDICINE

## 2023-03-05 PROCEDURE — 36415 COLL VENOUS BLD VENIPUNCTURE: CPT | Performed by: EMERGENCY MEDICINE

## 2023-03-05 PROCEDURE — 83690 ASSAY OF LIPASE: CPT | Performed by: EMERGENCY MEDICINE

## 2023-03-05 PROCEDURE — 74177 CT ABD & PELVIS W/CONTRAST: CPT

## 2023-03-05 PROCEDURE — 93005 ELECTROCARDIOGRAM TRACING: CPT

## 2023-03-05 PROCEDURE — 82310 ASSAY OF CALCIUM: CPT | Performed by: EMERGENCY MEDICINE

## 2023-03-05 PROCEDURE — 84484 ASSAY OF TROPONIN QUANT: CPT | Performed by: EMERGENCY MEDICINE

## 2023-03-05 PROCEDURE — 96374 THER/PROPH/DIAG INJ IV PUSH: CPT

## 2023-03-05 PROCEDURE — 81001 URINALYSIS AUTO W/SCOPE: CPT | Performed by: EMERGENCY MEDICINE

## 2023-03-05 RX ORDER — ONDANSETRON 2 MG/ML
4 INJECTION INTRAMUSCULAR; INTRAVENOUS ONCE
Status: COMPLETED | OUTPATIENT
Start: 2023-03-05 | End: 2023-03-05

## 2023-03-05 RX ORDER — HYDROMORPHONE HYDROCHLORIDE 1 MG/ML
0.5 INJECTION, SOLUTION INTRAMUSCULAR; INTRAVENOUS; SUBCUTANEOUS
Status: COMPLETED | OUTPATIENT
Start: 2023-03-05 | End: 2023-03-05

## 2023-03-05 RX ORDER — IOPAMIDOL 755 MG/ML
500 INJECTION, SOLUTION INTRAVASCULAR ONCE
Status: COMPLETED | OUTPATIENT
Start: 2023-03-05 | End: 2023-03-05

## 2023-03-05 RX ORDER — SODIUM CHLORIDE 9 MG/ML
INJECTION, SOLUTION INTRAVENOUS CONTINUOUS
Status: DISCONTINUED | OUTPATIENT
Start: 2023-03-05 | End: 2023-03-06 | Stop reason: HOSPADM

## 2023-03-05 RX ADMIN — HYDROMORPHONE HYDROCHLORIDE 0.5 MG: 1 INJECTION, SOLUTION INTRAMUSCULAR; INTRAVENOUS; SUBCUTANEOUS at 22:01

## 2023-03-05 RX ADMIN — IOPAMIDOL 85 ML: 755 INJECTION, SOLUTION INTRAVENOUS at 23:21

## 2023-03-05 RX ADMIN — ONDANSETRON 4 MG: 2 INJECTION INTRAMUSCULAR; INTRAVENOUS at 20:54

## 2023-03-05 RX ADMIN — SODIUM CHLORIDE 1000 ML: 9 INJECTION, SOLUTION INTRAVENOUS at 22:00

## 2023-03-05 ASSESSMENT — ACTIVITIES OF DAILY LIVING (ADL)
ADLS_ACUITY_SCORE: 35
ADLS_ACUITY_SCORE: 35

## 2023-03-06 VITALS
RESPIRATION RATE: 16 BRPM | DIASTOLIC BLOOD PRESSURE: 74 MMHG | TEMPERATURE: 98.7 F | OXYGEN SATURATION: 90 % | SYSTOLIC BLOOD PRESSURE: 108 MMHG | HEART RATE: 89 BPM

## 2023-03-06 LAB
ATRIAL RATE - MUSE: 96 BPM
DIASTOLIC BLOOD PRESSURE - MUSE: NORMAL MMHG
INTERPRETATION ECG - MUSE: NORMAL
P AXIS - MUSE: 68 DEGREES
PR INTERVAL - MUSE: 154 MS
QRS DURATION - MUSE: 84 MS
QT - MUSE: 338 MS
QTC - MUSE: 427 MS
R AXIS - MUSE: 84 DEGREES
SYSTOLIC BLOOD PRESSURE - MUSE: NORMAL MMHG
T AXIS - MUSE: 20 DEGREES
VENTRICULAR RATE- MUSE: 96 BPM

## 2023-03-06 NOTE — ED PROVIDER NOTES
History     Chief Complaint:  Chest Pain       HPI   Desmond Huerta is a 27 year old male, on Xarelto, with a history of PE who presents with right chest and lower abdomen pain that has been intermittent the last 3 hours. He describes the pain as a stabbing sensation. He has shortness of breath and is not able to keep food down. The pain in his abdomin started first but he does not know if that pain is causing the chest pain or shortness of breath. He has diarrhea. He has some swelling in his left leg. No sick contacts. No cough, rhinorrhea, or fever. He never missed a dose of Xarelto. He denies coughing up blood. No drug or alcohol use.     Independent Historian: the patient and his father    Review of External Notes: ED note on 3/4/22 when he had a PE    ROS:  Review of Systems   All other systems reviewed and are negative.      Allergies:  No Known Allergies     Medications:    Norco  Xarelto  Flexeril     Past Medical History:    PE    Social History:   reports that he has quit smoking. He does not have any smokeless tobacco history on file. He reports current alcohol use. He reports that he does not currently use drugs after having used the following drugs: Marijuana.  PCP: No Ref-Primary, Physician     Physical Exam     Patient Vitals for the past 24 hrs:   BP Temp Temp src Pulse Resp SpO2   03/05/23 2300 124/80 -- -- 89 18 93 %   03/05/23 2245 126/80 -- -- 90 11 94 %   03/05/23 2039 132/88 98.7  F (37.1  C) Temporal 100 24 97 %        Physical Exam  General: Resting on the bed.  Head: No obvious trauma to head.  Ears, Nose, Throat:  External ears normal.  Nose normal.   Eyes:  Conjunctivae clear.    CV: Regular rate and rhythm.  No murmurs.   2+ radial pulses   Respiratory: Effort normal and breath sounds normal.  No wheezing or crackles.   Gastrointestinal: Soft.  No distension. There is RUQ and RLQ tenderness.  There is no rigidity, no rebound and no guarding.   Musculoskeletal: Non tender non edematous  calves  Neuro: Alert. Moving all extremities appropriately.  Normal speech.    Skin: Skin is warm and dry.  No rash noted.       Emergency Department Course   ECG  ECG taken at 2046, ECG read at 2055  Normal sinus rhythm    Rate 96 bpm. DE interval 154 ms. QRS duration 84 ms. QT/QTc 338/427 ms. P-R-T axes 68 84 20.     Imaging:  CT Abdomen Pelvis w Contrast   Final Result   IMPRESSION:    1.  Normal.         Report per radiology    Laboratory:  Labs Ordered and Resulted from Time of ED Arrival to Time of ED Departure   BASIC METABOLIC PANEL - Abnormal       Result Value    Sodium 137      Potassium 4.5      Chloride 96 (*)     Carbon Dioxide (CO2) 27      Anion Gap 14      Urea Nitrogen 15.1      Creatinine 0.91      Calcium 9.9      Glucose 103 (*)     GFR Estimate >90     CBC WITH PLATELETS AND DIFFERENTIAL - Abnormal    WBC Count 11.6 (*)     RBC Count 5.04      Hemoglobin 17.0      Hematocrit 48.6      MCV 96      MCH 33.7 (*)     MCHC 35.0      RDW 12.0      Platelet Count 268      % Neutrophils 83      % Lymphocytes 7      % Monocytes 8      % Eosinophils 1      % Basophils 0      % Immature Granulocytes 1      NRBCs per 100 WBC 0      Absolute Neutrophils 9.8 (*)     Absolute Lymphocytes 0.8      Absolute Monocytes 0.9      Absolute Eosinophils 0.1      Absolute Basophils 0.0      Absolute Immature Granulocytes 0.1      Absolute NRBCs 0.0     ROUTINE UA WITH MICROSCOPIC REFLEX TO CULTURE - Abnormal    Color Urine Yellow      Appearance Urine Clear      Glucose Urine Negative      Bilirubin Urine Negative      Ketones Urine Negative      Specific Gravity Urine 1.015      Blood Urine Negative      pH Urine 6.0      Protein Albumin Urine 30 (*)     Urobilinogen Urine Normal      Nitrite Urine Negative      Leukocyte Esterase Urine Negative      Mucus Urine Present (*)     RBC Urine <1      WBC Urine <1      Squamous Epithelials Urine <1     HEPATIC FUNCTION PANEL - Abnormal    Protein Total 8.4 (*)     Albumin  5.3 (*)     Bilirubin Total 0.9      Alkaline Phosphatase 92      AST 63 (*)      (*)     Bilirubin Direct <0.20     TROPONIN T, HIGH SENSITIVITY - Normal    Troponin T, High Sensitivity <6     D DIMER QUANTITATIVE - Normal    D-Dimer Quantitative 0.48     LIPASE - Normal    Lipase 16        Emergency Department Course & Assessments:             Interventions:  Medications   0.9% sodium chloride BOLUS (0 mLs Intravenous Stopped 3/5/23 2332)     Followed by   sodium chloride 0.9% infusion (has no administration in time range)   ondansetron (ZOFRAN) injection 4 mg (4 mg Intravenous $Given 3/5/23 2054)   HYDROmorphone (PF) (DILAUDID) injection 0.5 mg (0.5 mg Intravenous $Given 3/5/23 2201)   iopamidol (ISOVUE-370) solution 500 mL (85 mLs Intravenous $Given 3/5/23 2321)   sodium chloride (PF) 0.9% PF flush 100 mL (61 mLs Intravenous $Given 3/5/23 2321)        Assessments:   I obtained a history and examined the patient     Independent Interpretation (X-rays, CTs, rhythm strip):  None    Consultations/Discussion of Management or Tests:  None     Social Determinants of Health affecting care:   None    Disposition:  The patient was discharged to home.     Impression & Plan      Medical Decision Makin-year-old male presents to the ER with right lower chest and abdominal pain.  Vital signs are reassuring.  Broad differential was pursued including not limited to ACS, arrhythmia, pneumonia, pneumothorax, effusion, cholecystitis, cholelithiasis, appendicitis, obstruction, perforation, UTI, etc.  CBC shows mild leukocytosis but no anemia.  BMP shows no acute electrolyte, metabolic or renal dysfunction.  LFTs show minimal elevation in AST and ALT although hemolyzed, will recommend close follow-up for this to be rechecked.  No signs of obstructive biliary process.  Lipase is normal not suggestive of pancreatitis.  UA showing no signs of infection.  EKG showed sinus rhythm, no acute ischemic changes.  Troponin  undetectable.  Patient is otherwise low risk, do not think ACS or arrhythmia based on symptoms.  No EKG changes indicate myocarditis or pericarditis.  Considered recurrent PE but patient's dimer is negative.  Patient reports compliance with PE.  Very unlikely to have PE with negative dimer and compliance with Xarelto.  No cough, runny nose, fevers.  No hypoxia.  I do not suspect acute pneumonia, pneumothorax or effusion.  Patient's exam is most consistent with tenderness on the right abdomen.  CT scan shows no acute surgical pathology.  Patient has right-sided abdominal pain.  Lower greater than upper.  I considered ultrasound but given no abnormal findings on CT scan I do not think ultrasound will be of added value.  My suspicion for cholecystitis, cholangitis is very low based on reassuring CT scan and exam.  No clear etiology of abdominal pain.  He did report no BM today and we discussed possible constipation management.  Patient otherwise feeling better.  I think they are appropriate for discharge.  Patient was discharged home.      Diagnosis:    ICD-10-CM    1. Abdominal pain, right lower quadrant  R10.31            Discharge Medications:  New Prescriptions    No medications on file          Scribe Disclosure:  I, Demi Ramirez, am serving as a scribe at 9:27 PM on 3/5/2023 to document services personally performed by Emelia Delvalle MD based on my observations and the provider's statements to me.   3/5/2023   Emelia Delvalle MD Bennett, Jennifer L, MD  03/06/23 0019
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
## 2023-06-24 ENCOUNTER — HEALTH MAINTENANCE LETTER (OUTPATIENT)
Age: 28
End: 2023-06-24

## 2024-02-10 ENCOUNTER — APPOINTMENT (OUTPATIENT)
Dept: GENERAL RADIOLOGY | Facility: CLINIC | Age: 29
End: 2024-02-10
Attending: EMERGENCY MEDICINE

## 2024-02-10 ENCOUNTER — HOSPITAL ENCOUNTER (EMERGENCY)
Facility: CLINIC | Age: 29
Discharge: HOME | End: 2024-02-10
Attending: EMERGENCY MEDICINE | Admitting: EMERGENCY MEDICINE
Payer: COMMERCIAL

## 2024-02-10 VITALS
TEMPERATURE: 97 F | HEART RATE: 97 BPM | OXYGEN SATURATION: 99 % | SYSTOLIC BLOOD PRESSURE: 112 MMHG | RESPIRATION RATE: 18 BRPM | DIASTOLIC BLOOD PRESSURE: 77 MMHG

## 2024-02-10 DIAGNOSIS — R93.6 ABNORMAL X-RAY OF HAND: ICD-10-CM

## 2024-02-10 DIAGNOSIS — S60.221A CONTUSION OF RIGHT HAND, INITIAL ENCOUNTER: ICD-10-CM

## 2024-02-10 PROCEDURE — 99284 EMERGENCY DEPT VISIT MOD MDM: CPT

## 2024-02-10 PROCEDURE — 29125 APPL SHORT ARM SPLINT STATIC: CPT | Mod: RT

## 2024-02-10 PROCEDURE — 73130 X-RAY EXAM OF HAND: CPT | Mod: RT
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
## 2024-08-17 ENCOUNTER — HEALTH MAINTENANCE LETTER (OUTPATIENT)
Age: 29
End: 2024-08-17

## 2024-10-05 ENCOUNTER — OFFICE VISIT (OUTPATIENT)
Dept: FAMILY MEDICINE | Facility: CLINIC | Age: 29
End: 2024-10-05

## 2024-10-05 VITALS
RESPIRATION RATE: 18 BRPM | WEIGHT: 180.3 LBS | HEART RATE: 88 BPM | BODY MASS INDEX: 28.24 KG/M2 | SYSTOLIC BLOOD PRESSURE: 121 MMHG | TEMPERATURE: 97.6 F | OXYGEN SATURATION: 97 % | DIASTOLIC BLOOD PRESSURE: 83 MMHG

## 2024-10-05 DIAGNOSIS — J06.9 UPPER RESPIRATORY TRACT INFECTION, UNSPECIFIED TYPE: Primary | ICD-10-CM

## 2024-10-05 DIAGNOSIS — R07.0 THROAT PAIN: ICD-10-CM

## 2024-10-05 DIAGNOSIS — R52 BODY ACHES: ICD-10-CM

## 2024-10-05 DIAGNOSIS — Z11.52 ENCOUNTER FOR SCREENING FOR COVID-19: ICD-10-CM

## 2024-10-05 LAB
DEPRECATED S PYO AG THROAT QL EIA: NEGATIVE
FLUAV AG SPEC QL IA: NEGATIVE
FLUBV AG SPEC QL IA: NEGATIVE

## 2024-10-05 PROCEDURE — 87804 INFLUENZA ASSAY W/OPTIC: CPT

## 2024-10-05 PROCEDURE — 87635 SARS-COV-2 COVID-19 AMP PRB: CPT

## 2024-10-05 PROCEDURE — 87651 STREP A DNA AMP PROBE: CPT

## 2024-10-05 PROCEDURE — 99203 OFFICE O/P NEW LOW 30 MIN: CPT

## 2024-10-05 ASSESSMENT — ENCOUNTER SYMPTOMS
RHINORRHEA: 1
SHORTNESS OF BREATH: 1
SORE THROAT: 1
COUGH: 1

## 2024-10-05 NOTE — LETTER
October 5, 2024      Desmond JUNAID GarciaHuerta  [id number] [facility] [id number]        To Whom It May Concern:    Desmond Huerta  was seen on 10/05/24  .  Please excuse him from work  until 10/07/2024 due to illness.        Sincerely,    ARINA Goel [facility]

## 2024-10-05 NOTE — PROGRESS NOTES
Assessment & Plan     Upper respiratory tract infection, unspecified type    Supportive care recommended (rest, adequate fluid intake and analgesics such as acetaminophen and ibuprofen)    Throat pain    -Strep (-)  - Streptococcus A Rapid Screen w/Reflex to PCR  - Group A Streptococcus PCR Throat Swab    Encounter for screening for COVID-19    - Symptomatic COVID-19 Virus (Coronavirus) by PCR Nose    Body aches    -Influenza A and B (-)  - Influenza A & B Antigen    -Work note provided for patient    Results for orders placed or performed in visit on 10/05/24   Streptococcus A Rapid Screen w/Reflex to PCR     Status: Normal    Specimen: Throat; Swab   Result Value Ref Range    Group A Strep antigen Negative Negative   Influenza A & B Antigen     Status: Normal    Specimen: Nasopharyngeal; Swab   Result Value Ref Range    Influenza A antigen Negative Negative    Influenza B antigen Negative Negative    Narrative    Test results must be correlated with clinical data. If necessary, results should be confirmed by a molecular assay or viral culture.         Patient Instructions   Increase fluids with water, Gatorade, or rehydrating beverages. Alternate acetaminophen and Ibuprofen as needed for aches, pains or fever. For cough I suggest using over the counter medications such as dextromethorphan (Delsym) or guaifenesin (Mucinex).  Delsym is a cough suppressant.  Mucinex will reduce the viscosity of mucus secretions and help with productive cough. Drink plenty of fluids while on Mucinex.  Follow up in clinic if symptoms persist or worsen. This usually can last 7-10 days.       Return if symptoms worsen or fail to improve, for Follow up.    At the end of the encounter, I discussed results, diagnosis, medications. Discussed red flags for immediate return to clinic/ER, as well as indications for follow up if no improvement. Patient understood and agreed to plan. Patient was stable for discharge.    [facility] is a  29 year old male who presents to clinic today  for the following health issues:  Chief Complaint   Patient presents with    Throat Pain     Throat pain and irritation in the throat also gets out of breath onset 2 days ago.     HPI    Patient reports upper respiratory symptoms which started 2 days ago.  He reports sore throat, congestion, runny nose, cough.  He reports shortness of breath when coughing.  He has been taking herbal home remedies which help.  He has not tried any analgesics.  He denies fever, chills, chest pain.  He is requesting a work note    Review of Systems   HENT:  Positive for congestion, rhinorrhea and sore throat.    Respiratory:  Positive for cough and shortness of breath.        Problem List:  2022-03: Acute pulmonary embolism (H)      No past medical history on file.    Social History     Tobacco Use    Smoking status: Former    Smokeless tobacco: Not on file   Substance Use Topics    Alcohol use: Yes           Objective    /83 (BP Location: Left arm, Patient Position: Sitting, Cuff Size: Adult Regular)   Pulse 88   Temp 97.6  F (36.4  C) (Tympanic)   Resp 18   Wt 81.8 kg (180 lb 4.8 oz)   SpO2 97%   BMI 28.24 kg/m    Physical Exam  Constitutional:       Appearance: Normal appearance.   HENT:      Head: Normocephalic.      Right Ear: Tympanic membrane normal.      Left Ear: Tympanic membrane normal.      Mouth/Throat:      Mouth: Mucous membranes are moist.      Pharynx: Oropharynx is clear. Uvula midline. No posterior oropharyngeal erythema.   Cardiovascular:      Rate and Rhythm: Normal rate and regular rhythm.   Pulmonary:      Effort: Pulmonary effort is normal.      Breath sounds: Normal breath sounds.   Lymphadenopathy:      Head:      Right side of head: No submental, submandibular or tonsillar adenopathy.      Left side of head: No submental, submandibular or tonsillar adenopathy.      Cervical: No cervical adenopathy.      Right cervical: No superficial cervical  adenopathy.     Left cervical: No superficial cervical adenopathy.   Skin:     General: Skin is warm and dry.      Findings: No rash.   Neurological:      Mental Status: He is alert.   Psychiatric:         Mood and Affect: Mood normal.         Behavior: Behavior normal.              Anika Patterson PA-C

## 2024-10-06 LAB
GROUP A STREP BY PCR: NOT DETECTED
SARS-COV-2 RNA RESP QL NAA+PROBE: NEGATIVE

## 2025-03-10 ENCOUNTER — VIRTUAL VISIT (OUTPATIENT)
Dept: INTERNAL MEDICINE | Facility: CLINIC | Age: 30
End: 2025-03-10

## 2025-03-10 DIAGNOSIS — Z86.718 PERSONAL HISTORY OF DVT (DEEP VEIN THROMBOSIS): ICD-10-CM

## 2025-03-10 DIAGNOSIS — M79.89 PAIN AND SWELLING OF LEFT LOWER EXTREMITY: Primary | ICD-10-CM

## 2025-03-10 DIAGNOSIS — M79.605 PAIN AND SWELLING OF LEFT LOWER EXTREMITY: Primary | ICD-10-CM

## 2025-03-10 DIAGNOSIS — D68.59 ANTITHROMBIN III DEFICIENCY: ICD-10-CM

## 2025-03-10 PROCEDURE — 98006 SYNCH AUDIO-VIDEO EST MOD 30: CPT

## 2025-03-10 NOTE — PROGRESS NOTES
"Desmond is a 29 year old who is being evaluated via a billable video visit.    {ROOMING STAFF complete during rooming of virtual visit (Optional):385259}  {If patient encounters technical issues they should call [phone] :699178}    {PROVIDER CHARTING PREFERENCE:257548}    Subjective   Desmond is a 29 year old, presenting for the following health issues:  No chief complaint on file.  {(!) Visit Details have not yet been documented.  Please enter Visit Details and then use this list to pull in documentation. (Optional):378181}  HPI      {SUPERLIST (Optional):815991}  {additonal problems for provider to add (Optional):192277}    {ROS Picklists (Optional):080758}      Objective           Vitals:  No vitals were obtained today due to virtual visit.    Physical Exam   {video visit exam brief selected:833931}    {Diagnostic Test Results (Optional):131094}      Video-Visit Details    Type of service:  Video Visit   Originating Location (pt. Location): {video visit patient location:807962::\"Home\"}  {PROVIDER LOCATION On-site should be selected for visits conducted from your clinic location or adjoining [facility] hospital, academic office, or other nearby [facility] building. Off-site should be selected for all other provider locations, including home:139937}  Distant Location (provider location):  {virtual location provider:678096}  Platform used for Video Visit: {Virtual Visit Platforms:966334::\"[employer]\"}  Signed Electronically by: MARCUS Pittman CNP  {Email feedback regarding this note to [email or web address]   :112490}  "

## 2025-03-10 NOTE — PROGRESS NOTES
"Desmond is a 29 year old who is being evaluated via a billable video visit.    How would you like to obtain your AVS? MyChart  If the video visit is dropped, the invitation should be resent by: Text to cell phone: [phone]  Will anyone else be joining your video visit? No  {If patient encounters technical issues they should call [phone] :528725}    {PROVIDER CHARTING PREFERENCE:297374}    Subjective   Desmond is a 29 year old, presenting for the following health issues:  Medication Request         No data to display              HPI      {SUPERLIST (Optional):781528}  {additonal problems for provider to add (Optional):687515}    {ROS Picklists (Optional):376708}      Objective           Vitals:  No vitals were obtained today due to virtual visit.    Physical Exam   {video visit exam brief selected:694770}    {Diagnostic Test Results (Optional):544742}      Video-Visit Details    Type of service:  Video Visit   Originating Location (pt. Location): {video visit patient location:254853::\"Home\"}  {PROVIDER LOCATION On-site should be selected for visits conducted from your clinic location or adjoining [facility] hospital, academic office, or other nearby [facility] building. Off-site should be selected for all other provider locations, including home:611335}  Distant Location (provider location):  {virtual location provider:898597}  Platform used for Video Visit: {Virtual Visit Platforms:955465::\"[employer]\"}  Signed Electronically by: MARCUS Pittman CNP  {Email feedback regarding this note to [email or web address]   :297432}  "

## 2025-03-10 NOTE — PROGRESS NOTES
Desmond is a 29 year old who is being evaluated via a billable video visit.    How would you like to obtain your AVS? MyChart  If the video visit is dropped, the invitation should be resent by: Text to cell phone: [phone]  Will anyone else be joining your video visit? No      Assessment & Plan     (M79.605,  M79.89) Pain and swelling of left lower extremity  (primary encounter diagnosis)  Comment: Pt presents today to establish care. He has underlying Antithrombin III deficiency.   He had submassive PE and left LE DVT in 2022 and was treated with Xarelto and recommended to stay on this indefinitely.   He then stopped his Xarelto in July of 2023 for a short period of time and was found to have a RLE DVT 2-3 weeks after stopping his DOAC.    Today he tells me it has been 6-7 months since he has been on his Xarelto. He was on a leave of absence and thus couldn't afford his DOAC while on COBRA insurance. I reiterated importance of him staying on this medication indefinitely and if he has trouble affording this he should let his PCP know so we can help assist him in any way possible.       He also endorses left lower extremity pain beneath his kneecap which began about 3 days ago.  No erythema but does endorse edema.   Is concerned he has another clot.   Is having mild shortness of breath but thinks this is due to stress.   No chest pain or dyspnea. Given his history and the fact he has been off of his DOAC for 6+ months, I did  recommend further evaluation in ED.     I will send rx for Xarelto after I see US results from ED in case he has DVT present which would determine dosing of DOAC.   Plan:     (D68.59) Antithrombin III deficiency  Comment: See details above  Plan:          The longitudinal plan of care for the diagnosis(es)/condition(s) as documented were addressed during this visit. Due to the added complexity in care, I will continue to support Desmond in the subsequent management and with ongoing continuity of  care.      30 minutes spent on the date of the encounter doing chart review, history and exam, documentation and further activities per the note         Subjective   Desmond is a 29 year old, presenting for the following health issues:  Medication Request      3/10/2025     2:20 PM   Additional Questions   Roomed by Barbie QUINONES                  Objective           Vitals:  No vitals were obtained today due to virtual visit.    Physical Exam   GENERAL: alert and no distress  EYES: Eyes grossly normal to inspection.  No discharge or erythema, or obvious scleral/conjunctival abnormalities.  RESP: No audible wheeze, cough, or visible cyanosis.    SKIN: Visible skin clear. No significant rash, abnormal pigmentation or lesions.  NEURO: Cranial nerves grossly intact.  Mentation and speech appropriate for age.  PSYCH: Appropriate affect, tone, and pace of words          Video-Visit Details    Type of service:  Video Visit   Originating Location (pt. Location): Home    Distant Location (provider location):  Off-site  Platform used for Video Visit: Jhonatan  Signed Electronically by: MARCUS Pittman CNP

## 2025-03-11 ENCOUNTER — TELEPHONE (OUTPATIENT)
Dept: INTERNAL MEDICINE | Facility: CLINIC | Age: 30
End: 2025-03-11

## 2025-03-11 ENCOUNTER — HOSPITAL ENCOUNTER (EMERGENCY)
Facility: CLINIC | Age: 30
Discharge: HOME | End: 2025-03-11
Attending: PHYSICIAN ASSISTANT | Admitting: PHYSICIAN ASSISTANT

## 2025-03-11 ENCOUNTER — APPOINTMENT (OUTPATIENT)
Dept: GENERAL RADIOLOGY | Facility: CLINIC | Age: 30
End: 2025-03-11
Attending: PHYSICIAN ASSISTANT

## 2025-03-11 ENCOUNTER — APPOINTMENT (OUTPATIENT)
Dept: ULTRASOUND IMAGING | Facility: CLINIC | Age: 30
End: 2025-03-11
Attending: EMERGENCY MEDICINE

## 2025-03-11 VITALS
TEMPERATURE: 97.8 F | HEIGHT: 68 IN | BODY MASS INDEX: 28.47 KG/M2 | RESPIRATION RATE: 18 BRPM | WEIGHT: 187.83 LBS | HEART RATE: 96 BPM | OXYGEN SATURATION: 99 % | DIASTOLIC BLOOD PRESSURE: 95 MMHG | SYSTOLIC BLOOD PRESSURE: 149 MMHG

## 2025-03-11 DIAGNOSIS — R06.02 SOB (SHORTNESS OF BREATH): ICD-10-CM

## 2025-03-11 DIAGNOSIS — M79.662 PAIN OF LEFT LOWER LEG: ICD-10-CM

## 2025-03-11 DIAGNOSIS — D68.59 ANTITHROMBIN III DEFICIENCY: Primary | ICD-10-CM

## 2025-03-11 DIAGNOSIS — Z59.86 FINANCIAL INSECURITY: ICD-10-CM

## 2025-03-11 LAB
ANION GAP SERPL CALCULATED.3IONS-SCNC: 13 MMOL/L (ref 7–15)
BASOPHILS # BLD AUTO: 0 10E3/UL (ref 0–0.2)
BASOPHILS NFR BLD AUTO: 1 %
BUN SERPL-MCNC: 13 MG/DL (ref 6–20)
CALCIUM SERPL-MCNC: 9.4 MG/DL (ref 8.8–10.4)
CHLORIDE SERPL-SCNC: 103 MMOL/L (ref 98–107)
CREAT SERPL-MCNC: 0.94 MG/DL (ref 0.67–1.17)
D DIMER PPP FEU-MCNC: 0.49 UG/ML FEU (ref 0–0.5)
EGFRCR SERPLBLD CKD-EPI 2021: >90 ML/MIN/1.73M2
EOSINOPHIL # BLD AUTO: 0 10E3/UL (ref 0–0.7)
EOSINOPHIL NFR BLD AUTO: 0 %
ERYTHROCYTE [DISTWIDTH] IN BLOOD BY AUTOMATED COUNT: 13.3 % (ref 10–15)
GLUCOSE SERPL-MCNC: 96 MG/DL (ref 70–99)
HCO3 SERPL-SCNC: 24 MMOL/L (ref 22–29)
HCT VFR BLD AUTO: 41.3 % (ref 40–53)
HGB BLD-MCNC: 14.3 G/DL (ref 13.3–17.7)
IMM GRANULOCYTES # BLD: 0 10E3/UL
IMM GRANULOCYTES NFR BLD: 0 %
LYMPHOCYTES # BLD AUTO: 1.8 10E3/UL (ref 0.8–5.3)
LYMPHOCYTES NFR BLD AUTO: 23 %
MCH RBC QN AUTO: 32.4 PG (ref 26.5–33)
MCHC RBC AUTO-ENTMCNC: 34.6 G/DL (ref 31.5–36.5)
MCV RBC AUTO: 94 FL (ref 78–100)
MONOCYTES # BLD AUTO: 0.6 10E3/UL (ref 0–1.3)
MONOCYTES NFR BLD AUTO: 8 %
NEUTROPHILS # BLD AUTO: 5.3 10E3/UL (ref 1.6–8.3)
NEUTROPHILS NFR BLD AUTO: 68 %
NRBC # BLD AUTO: 0 10E3/UL
NRBC BLD AUTO-RTO: 0 /100
NT-PROBNP SERPL-MCNC: <36 PG/ML (ref 0–450)
PLATELET # BLD AUTO: 211 10E3/UL (ref 150–450)
POTASSIUM SERPL-SCNC: 4.4 MMOL/L (ref 3.4–5.3)
RBC # BLD AUTO: 4.41 10E6/UL (ref 4.4–5.9)
SODIUM SERPL-SCNC: 140 MMOL/L (ref 135–145)
TROPONIN T SERPL HS-MCNC: <6 NG/L
WBC # BLD AUTO: 7.8 10E3/UL (ref 4–11)

## 2025-03-11 PROCEDURE — 36415 COLL VENOUS BLD VENIPUNCTURE: CPT | Performed by: PHYSICIAN ASSISTANT

## 2025-03-11 PROCEDURE — 85004 AUTOMATED DIFF WBC COUNT: CPT | Performed by: PHYSICIAN ASSISTANT

## 2025-03-11 PROCEDURE — 99285 EMERGENCY DEPT VISIT HI MDM: CPT | Mod: 25 | Performed by: PHYSICIAN ASSISTANT

## 2025-03-11 PROCEDURE — 80048 BASIC METABOLIC PNL TOTAL CA: CPT | Performed by: PHYSICIAN ASSISTANT

## 2025-03-11 PROCEDURE — 83880 ASSAY OF NATRIURETIC PEPTIDE: CPT | Performed by: PHYSICIAN ASSISTANT

## 2025-03-11 PROCEDURE — 93005 ELECTROCARDIOGRAM TRACING: CPT | Performed by: PHYSICIAN ASSISTANT

## 2025-03-11 PROCEDURE — 93971 EXTREMITY STUDY: CPT | Mod: LT

## 2025-03-11 PROCEDURE — 71046 X-RAY EXAM CHEST 2 VIEWS: CPT

## 2025-03-11 PROCEDURE — 85379 FIBRIN DEGRADATION QUANT: CPT | Performed by: PHYSICIAN ASSISTANT

## 2025-03-11 PROCEDURE — 84484 ASSAY OF TROPONIN QUANT: CPT | Performed by: PHYSICIAN ASSISTANT

## 2025-03-11 PROCEDURE — 82310 ASSAY OF CALCIUM: CPT | Performed by: PHYSICIAN ASSISTANT

## 2025-03-11 SDOH — ECONOMIC STABILITY - INCOME SECURITY: FINANCIAL INSECURITY: Z59.86

## 2025-03-11 ASSESSMENT — ACTIVITIES OF DAILY LIVING (ADL)
ADLS_ACUITY_SCORE: 41
ADLS_ACUITY_SCORE: 41

## 2025-03-11 ASSESSMENT — COLUMBIA-SUICIDE SEVERITY RATING SCALE - C-SSRS
1. IN THE PAST MONTH, HAVE YOU WISHED YOU WERE DEAD OR WISHED YOU COULD GO TO SLEEP AND NOT WAKE UP?: NO
2. HAVE YOU ACTUALLY HAD ANY THOUGHTS OF KILLING YOURSELF IN THE PAST MONTH?: NO
6. HAVE YOU EVER DONE ANYTHING, STARTED TO DO ANYTHING, OR PREPARED TO DO ANYTHING TO END YOUR LIFE?: NO

## 2025-03-11 NOTE — TELEPHONE ENCOUNTER
Please call patient to remind him to seek evaluation in the ED for his potential DVT. You can see my note from yesterday afternoon

## 2025-03-11 NOTE — ED PROVIDER NOTES
"  Emergency Department Note      History of Present Illness     Chief Complaint   Leg Pain      HPI   Desmond Huerta is a 29 year old male with history of PE and DVT who presents to the ED for evaluation of leg pain. The patient reports that about 4 days ago he started having pain in his left leg around his calf along with some leg swelling. He endorses that his pain and swelling have decreased since Friday and denies any current chest pain or cough. Today, he reports having some lingering shortness of breath that began 2 days ago which he states may be anxiety related. He denies tobacco use, history of cancer, or current use of thinners.  No recent hospitalization or surgery.  No hemoptysis.      Independent Historian   None    Review of External Notes   NP GRECIA family med note from 3/10/25 at [facility]. Note hx of submassive PE and DVT previously on xarelto but stopped.    Past Medical History     Medical History and Problem List   Deep vein thrombosis  Pulmonary embolism    Medications   The patient is not currently taking any prescribed medications.  No longer taking Xarelto    Surgical History   The patient has no pertinent past surgical history.     Physical Exam     Patient Vitals for the past 24 hrs:   BP Temp Temp src Pulse Resp SpO2 Height Weight   03/11/25 1447 (!) 149/95 97.8  F (36.6  C) Oral 96 18 99 % 1.727 m (5' 8\") 85.2 kg (187 lb 13.3 oz)     Physical Exam  General: Awake, alert, non-toxic.  Head:  Scalp is NC/AT  Eyes:  Conjunctiva normal, PERRL  ENT:  The external nose and ears are normal.     Oropharynx clear, uvula midline.  Neck:  Normal range of motion without rigidity.  CV:  Regular rate and rhythm    No pathologic murmur, rubs, or gallops.  Resp:  Breath sounds are clear bilaterally    Non-labored, no retractions or accessory muscle use  Abdomen: Abdomen is soft, no distension, no tenderness, no masses. No CVA tenderness.  MS:  No lower extremity edema/swelling.Extremities " without joint swelling or redness.  Skin:  Warm and dry, No rash or lesions noted. 2+ DP and PT pulses BL.  Neuro:  Alert and oriented.  GCS 15 Moves all extremities normal.  No facial asymmetry. Gait normal.  Psych:  Awake. Alert. Normal affect. Appropriate interactions.      Diagnostics     Lab Results   Labs Ordered and Resulted from Time of ED Arrival to Time of ED Departure   BASIC METABOLIC PANEL - Normal       Result Value    Sodium 140      Potassium 4.4      Chloride 103      Carbon Dioxide (CO2) 24      Anion Gap 13      Urea Nitrogen 13.0      Creatinine 0.94      GFR Estimate >90      Calcium 9.4      Glucose 96     TROPONIN T, HIGH SENSITIVITY - Normal    Troponin T, High Sensitivity <6     NT PROBNP INPATIENT - Normal    N terminal Pro BNP Inpatient <36     D DIMER QUANTITATIVE - Normal    D-Dimer Quantitative 0.49     CBC WITH PLATELETS AND DIFFERENTIAL    WBC Count 7.8      RBC Count 4.41      Hemoglobin 14.3      Hematocrit 41.3      MCV 94      MCH 32.4      MCHC 34.6      RDW 13.3      Platelet Count 211      % Neutrophils 68      % Lymphocytes 23      % Monocytes 8      % Eosinophils 0      % Basophils 1      % Immature Granulocytes 0      NRBCs per 100 WBC 0      Absolute Neutrophils 5.3      Absolute Lymphocytes 1.8      Absolute Monocytes 0.6      Absolute Eosinophils 0.0      Absolute Basophils 0.0      Absolute Immature Granulocytes 0.0      Absolute NRBCs 0.0         Imaging   Chest XR,  PA & LAT   Final Result   IMPRESSION: Lungs clear. No pneumothorax or pleural fluid. Normal heart size and pulmonary vascularity. Elevation right hemidiaphragm. No overt osseous abnormality.      US Lower Extremity Venous Duplex Left   Final Result   IMPRESSION:   1.  No deep venous thrombosis in the left lower extremity.        EKG   ECG taken at 1719, ECG read at 1725  Normal sinus rhythm  Early repolarization when compared to previous EKG, dated 03/05/2023  Rate 80 bpm. GA interval 178 ms. QRS duration  88 ms. QT/QTc 364/419 ms. P-R-T axes 63 66 28.    Independent Interpretation   CXR: No pneumothorax, infiltrate, pleural effusion, pulmonary edema, cardiomegaly, or mediastinal widening.    ED Course      Medications Administered   Medications - No data to display    Procedures   Procedures     Discussion of Management   None    ED Course   ED Course as of 03/11/25 1748   Tue Mar 11, 2025   1620 I obtained history and examined the patient as noted above.    1654 I rechecked and updated the patient.      Additional Documentation  None    Medical Decision Making / Diagnosis     CMS Diagnoses: None    MIPS       None    MDM   Desmond Huerta is a 29 year old male with a past history of thromboembolism no longer on Xarelto who presents with some left leg pain and mild shortness of breath.  Broad differential considered.  Workup here is reassuring.  Ultrasound of the left lower extremity is negative for DVT and D-dimer is also normal essentially ruling out clot.  There is no trauma or indication for x-rays and no bony tenderness.  No signs of compartment syndrome or arterial ischemia skin or soft tissue or joint or bone infection.  He notes those symptoms of already improved.  Regarding the shortness of breath he does suspect this may be due to anxiety however out of abundance of caution we did perform workup which was reassuring with EKG showing mild early repull but no evidence of ischemia or dysrhythmia troponin is undetectable no chest pain do not suspect ACS myocarditis pericarditis etc.  D-dimer normal not high risk by [place] for PE essentially ruling this out.  BNP normal no clinical evidence of CHF and chest x-ray clear without pneumonia pneumothorax CHF or other acute abnormality.  Not anemic no metabolic acidosis and labs are reassuring.  Outpatient follow-up with PCP provided reassurance and return for new worsening or changing symptoms.    Disposition   The patient was discharged.     Diagnosis     ICD-10-CM     1. SOB (shortness of breath)  R06.02       2. Pain of left lower leg  M79.662            Discharge Medications   New Prescriptions    No medications on file     Scribe Disclosure:  I, Lenin Franco, am serving as a scribe at 4:28 PM on 3/11/2025 to document services personally performed by Georgi Barahona PA-C based on my observations and the provider's statements to me.        Georgi Barahona PA-C  03/11/25 6060

## 2025-03-11 NOTE — TELEPHONE ENCOUNTER
PT calls stating he doesn't have Health insurance and cannot afford the ED.   His work told him that since he didn't resubmit for insurance after 30 days of going back after his TRACEY, they said it was too late to apply for insurance.   He said no one told him about this, so he is going to have a discussion with his  to see if anything else can be done.   He works for [employer].     He says he doesn't want to go to ED. Due to the cost. He already has a lot of bills.   Offered the ADS but he thought Jackelyn wanted him to go to ED in case he had clot in his lungs.     Also, pended in Care coordinator referral to discuss with pt, his financial situation. He may be able to get medication assistance, state assistance if not eligible for work insurance?   Patient is agreeable to CC referral.     He states Jackelyn can call him if she has any extra time today, to discuss, if needed.

## 2025-03-11 NOTE — TELEPHONE ENCOUNTER
I strongly recommend ED visit as missing a PE or DVT could be fatal. He has symptoms of a DVT and has been off of his blood thinner for 6-7 months.  Insurance can retrospectively help pay for previous visits.  He needs to be seen in ED or it could be life threatening

## 2025-03-11 NOTE — ED TRIAGE NOTES
Patient reports left leg pain that started on Friday. Patient was seen at  and advised to come to the ED to rule out a DVT. Patient also reports mild SOB so they advised to rule out a PE     Triage Assessment (Adult)       Row Name 03/11/25 1446          Triage Assessment    Airway WDL WDL        Respiratory WDL    Respiratory WDL WDL        Skin Circulation/Temperature WDL    Skin Circulation/Temperature WDL WDL        Cardiac WDL    Cardiac WDL WDL        Peripheral/Neurovascular WDL    Peripheral Neurovascular WDL WDL        Cognitive/Neuro/Behavioral WDL    Cognitive/Neuro/Behavioral WDL WDL

## 2025-03-11 NOTE — TELEPHONE ENCOUNTER
Call to patient to relay Jackelyn's message. Patient says he reached out to someone from benefits and patient has another 30 days to reenroll in his insurance program, so once he does this today he will go to the emergency room this afternoon.    Patient was advised otherwise that CC will be in contact with him to help assist him with financially/insurances stuff.     Thank you,  Anuj, Triage CHARLI Jim    11:54 AM 3/11/2025

## 2025-03-12 ENCOUNTER — PATIENT OUTREACH (OUTPATIENT)
Dept: CARE COORDINATION | Facility: CLINIC | Age: 30
End: 2025-03-12

## 2025-03-12 LAB
ATRIAL RATE - MUSE: 80 BPM
DIASTOLIC BLOOD PRESSURE - MUSE: NORMAL MMHG
INTERPRETATION ECG - MUSE: NORMAL
P AXIS - MUSE: 63 DEGREES
PR INTERVAL - MUSE: 178 MS
QRS DURATION - MUSE: 88 MS
QT - MUSE: 364 MS
QTC - MUSE: 419 MS
R AXIS - MUSE: 66 DEGREES
SYSTOLIC BLOOD PRESSURE - MUSE: NORMAL MMHG
T AXIS - MUSE: 28 DEGREES
VENTRICULAR RATE- MUSE: 80 BPM

## 2025-03-12 NOTE — PROGRESS NOTES
Clinic Care Coordination Contact  Community Health Worker Initial Outreach    ** brief encounter **     CHW Initial Information Gathering:  Referral Source: PCP  Type of residence:: Private home - stairs  No PCP office visit in Past Year: No  CHW Additional Questions  If ED/Hospital discharge, follow-up appointment scheduled as recommended?: No (Only call PCP as needed)  Patient agreeable to assistance with scheduling?: N/A  Medication changes made following ED/Hospital discharge?: No  MyChart active?: Yes    Patient accepts CC: Yes. Patient scheduled for assessment with ROXANNE CC on TBD at [facility].     Patient noted desire to discuss:    Health insurance   Medication affordability/resources   [facility]    Patient feeling better after ED visit yesterday, 3/11/25 and returned to work today, 3/12/25.     Patient will try to re-enroll in his health insurance plan through his employer within next 30 days.     No outstanding balance for [facility] medical bills at this time. Patient wanted to know more about [facility] financial assistance for past [facility] medical bills. Informed of [facility] Care.     Patient was at work. Due to limited time, planned to reconnect this afternoon after 2pm.     Patient usually available on M-F after 2pm.     Sulma Araujo  Community Health Worker   [email or web address]   Clinic Care Coordination / Ambulatory Care Management  [facility], and [facility]  [email or web address]  Office: [phone]  Gender Pronouns: She/her/hers  Employed by [employer]

## 2025-03-12 NOTE — Clinical Note
ETHEL Naik referral closed due to 2 UTCs.   I briefly talked to patient yesterday, 3/12/25. He was busy at work, so he asked me to follow up with him after 2pm. I called him back yesterday but it was UTC. I followed up with him again today, 3/13/25 but another UTC.   CCC intro letter sent yesterday, 3/12/25 via [employer]. CC and SDOH questionnaires also sent today, 3/13/25 via [employer].   If he calls me back for additional support/resources, I will let you know.   Thank you,  Sulma Araujo  Community Health Worker  [email or web address]  Clinic Care Coordination / Ambulatory Care Management [facility], and [email or web address]  Office: [phone] Gender Pronouns: She/her/hers Employed by [employer]

## 2025-03-12 NOTE — LETTER
[facility] CARE COORDINATION  [address]  [facility] [place] [facility] [address]     March 12, 2025    [facility] JUNAID Huerta  [address] [place] [address]      Dear [facility],    I am a clinic community health worker who works with MARCUS Pittman CNP with the [facility]. I wanted to introduce myself and provide you with my contact information for you to be able to call me with any questions or concerns. I wanted to thank you for spending the time to talk with me.      Below is a description of clinic care coordination and how I can further assist you.       The clinic care coordination team is made up of a registered nurse, , financial resource worker and community health worker who understand the health care system. The goal of clinic care coordination is to help you manage your health and improve access to the health care system. Our team works alongside your provider to assist you in determining your health and social needs. We can help you obtain health care and community resources, providing you with necessary information and education. We can work with you through any barriers and develop a care plan that helps coordinate and strengthen the communication between you and your care team.  Our services are voluntary and are offered without charge to you personally.    Please feel free to contact me with any questions or concerns regarding care coordination and what we can offer.      We are focused on providing you with the highest-quality healthcare experience possible.    Sincerely,     Sulma Araujo  Community Health Worker   [email or web address]   Clinic Care Coordination / Ambulatory Care Management  [facility], and [facility]  [email or web address]  Office: [phone]  Gender Pronouns: She/her/hers  Employed by [employer]      Enclosed: Clinic Care Coordination Information       WHAT IS CARE COORDINATION?      [facility]  [place] Care Coordination supports patients and families dealing with chronic or complex health conditions, developmental issues, and social service needs. This service is available to patients of all ages, from babies to seniors. When you're facing a difficult decision about caring for yourself or someone you love, we can help you understand your options. We identify and refer you to community resources that help with financial, legal, mental health, transportation, and other issues. We also help with your medical and related education needs.      IS CARE COORDINATION RIGHT FOR ME?      Discuss a referral to Care Coordination with your primary care provider or care team member.      HOW CAN I CONNECT WITH CARE COORDINATION?      Contact your clinic.   Speak with your doctor or clinic staff.   Discuss care coordination with hospital staff before discharge.         MEET YOUR CARE COORDINATION TEAM      Registered Nurse Care Coordinator      Provides education on medications, disease management, and new diagnoses.   Addresses concerns about medical conditions and connects you to resources.   Develops patient-centered goals in collaboration with your care team and community partners.      Social Work Care Coordinator      Supports you with mental health concerns and psychosocial needs.   Connects you to a variety of community-based resources.   Develops patient-centered goals in collaboration with your care team and community partners.      Community Health Worker      Identifies health and social barriers and connects you with community resources.   Develops nonclinical patient-centered goals in collaboration with your care team and community partners.   Enhances communication between patients and care teams.      Financial Resource Worker      Assists you with applying for county-based health insurance and other benefits.   Connects you with [facility].

## 2025-03-12 NOTE — PROGRESS NOTES
Clinic Care Coordination Contact  [facility]/Voicemail    Clinical Data: Care Coordinator Outreach    Outreach Documentation Number of Outreach Attempt   3/12/2025   2:09 PM 1     Left message on patient's voicemail with call back information and requested return call.    CHW called back patient after 2pm as requested.     Plan: Care Coordinator will try to reach patient again in 1-2 business days.    Sulma Araujo  Community Health Worker   [email or web address]   Clinic Care Coordination / Ambulatory Care Management  [facility], and [facility]  [email or web address]  Office: [phone]  Gender Pronouns: She/her/hers  Employed by [employer]

## 2025-03-13 NOTE — PROGRESS NOTES
Clinic Care Coordination Contact  [facility]/Voicemail    Clinical Data: Care Coordinator Outreach    Outreach Documentation Number of Outreach Attempt   3/12/2025   2:09 PM 1   3/13/2025   4:30 PM 2     Left message on patient's voicemail with call back information and requested return call.    Plan: Care Coordinator sent care coordination introduction letter on 3/12/25 via [employer]. Care Coordinator will do no further outreaches at this time.    Sulma Araujo  Community Health Worker   [email or web address]   Clinic Care Coordination / Ambulatory Care Management  [facility], and [facility]  [email or web address]  Office: [phone]  Gender Pronouns: She/her/hers  Employed by [employer]

## 2025-03-14 ENCOUNTER — TELEPHONE (OUTPATIENT)
Dept: INTERNAL MEDICINE | Facility: CLINIC | Age: 30
End: 2025-03-14

## 2025-03-14 NOTE — TELEPHONE ENCOUNTER
Please call patient and let him know I sent rx for Xarelto to his pharmacy and it is important that he restart this and take it as directed.  I reviewed notes from the ED and am glad to see he did not have any DVT or PE.

## 2025-03-17 NOTE — TELEPHONE ENCOUNTER
Called and spoke with patient to relay provider message. Patient verbalizes understanding of instructions and indicates no further questions at this time. Patient will resume med.     Thank you,  Anuj, Triage RN Marlene Jim    8:33 AM 3/17/2025

## 2025-03-18 ENCOUNTER — DOCUMENTATION ONLY (OUTPATIENT)
Dept: ANTICOAGULATION | Facility: CLINIC | Age: 30
End: 2025-03-18

## 2025-03-18 NOTE — PROGRESS NOTES
Anticoagulant Therapeutic Duplication    Duplicate orders identified: identical order(s)    The duplicate anticoagulant order(s) has been discontinued    Active anticoagulant: rivaroxaban (Xarelto)    Plan made per [facility] anticoagulation protocol.    Hilda Urbano RN  3/18/2025